# Patient Record
Sex: FEMALE | Race: WHITE | Employment: FULL TIME | URBAN - NONMETROPOLITAN AREA
[De-identification: names, ages, dates, MRNs, and addresses within clinical notes are randomized per-mention and may not be internally consistent; named-entity substitution may affect disease eponyms.]

---

## 2017-02-07 ENCOUNTER — OFFICE VISIT (OUTPATIENT)
Dept: PRIMARY CARE CLINIC | Age: 27
End: 2017-02-07

## 2017-02-07 VITALS
RESPIRATION RATE: 16 BRPM | TEMPERATURE: 98.7 F | HEART RATE: 80 BPM | HEIGHT: 63 IN | OXYGEN SATURATION: 98 % | BODY MASS INDEX: 25.37 KG/M2 | DIASTOLIC BLOOD PRESSURE: 74 MMHG | WEIGHT: 143.2 LBS | SYSTOLIC BLOOD PRESSURE: 100 MMHG

## 2017-02-07 DIAGNOSIS — J01.40 ACUTE NON-RECURRENT PANSINUSITIS: Primary | ICD-10-CM

## 2017-02-07 PROCEDURE — 99214 OFFICE O/P EST MOD 30 MIN: CPT | Performed by: FAMILY MEDICINE

## 2017-02-07 RX ORDER — AMOXICILLIN AND CLAVULANATE POTASSIUM 500; 125 MG/1; MG/1
1 TABLET, FILM COATED ORAL 2 TIMES DAILY
Qty: 20 TABLET | Refills: 0 | Status: SHIPPED | OUTPATIENT
Start: 2017-02-07 | End: 2017-02-17

## 2017-02-07 ASSESSMENT — ENCOUNTER SYMPTOMS
SHORTNESS OF BREATH: 0
VOMITING: 0
NAUSEA: 0
WHEEZING: 0
SORE THROAT: 0
TROUBLE SWALLOWING: 0
SINUS PRESSURE: 1
COUGH: 0
DIARRHEA: 0
CONSTIPATION: 0
EYE DISCHARGE: 0
EYE REDNESS: 0
SINUS COMPLAINT: 1
RHINORRHEA: 1
ABDOMINAL PAIN: 0

## 2017-05-26 ENCOUNTER — HOSPITAL ENCOUNTER (EMERGENCY)
Age: 27
Discharge: HOME | End: 2017-05-26
Attending: EMERGENCY MEDICINE
Payer: MEDICAID

## 2017-05-26 VITALS
DIASTOLIC BLOOD PRESSURE: 67 MMHG | HEART RATE: 76 BPM | OXYGEN SATURATION: 99 % | SYSTOLIC BLOOD PRESSURE: 110 MMHG | TEMPERATURE: 97.5 F | RESPIRATION RATE: 12 BRPM

## 2017-05-26 DIAGNOSIS — G43.009 MIGRAINE WITHOUT AURA AND WITHOUT STATUS MIGRAINOSUS, NOT INTRACTABLE: Primary | ICD-10-CM

## 2017-05-26 PROCEDURE — 96372 THER/PROPH/DIAG INJ SC/IM: CPT

## 2017-05-26 PROCEDURE — 6360000002 HC RX W HCPCS: Performed by: EMERGENCY MEDICINE

## 2017-05-26 PROCEDURE — 99283 EMERGENCY DEPT VISIT LOW MDM: CPT

## 2017-05-26 RX ORDER — PROMETHAZINE HYDROCHLORIDE 25 MG/ML
25 INJECTION, SOLUTION INTRAMUSCULAR; INTRAVENOUS ONCE
Status: COMPLETED | OUTPATIENT
Start: 2017-05-26 | End: 2017-05-26

## 2017-05-26 RX ORDER — TRAMADOL HYDROCHLORIDE 50 MG/1
50 TABLET ORAL EVERY 6 HOURS PRN
Status: ON HOLD | COMMUNITY
End: 2018-08-15 | Stop reason: HOSPADM

## 2017-05-26 RX ORDER — KETOROLAC TROMETHAMINE 30 MG/ML
60 INJECTION, SOLUTION INTRAMUSCULAR; INTRAVENOUS ONCE
Status: COMPLETED | OUTPATIENT
Start: 2017-05-26 | End: 2017-05-26

## 2017-05-26 RX ORDER — NAPROXEN 500 MG/1
500 TABLET ORAL 2 TIMES DAILY WITH MEALS
Status: ON HOLD | COMMUNITY
End: 2018-08-15 | Stop reason: HOSPADM

## 2017-05-26 RX ORDER — DIPHENHYDRAMINE HYDROCHLORIDE 50 MG/ML
50 INJECTION INTRAMUSCULAR; INTRAVENOUS ONCE
Status: COMPLETED | OUTPATIENT
Start: 2017-05-26 | End: 2017-05-26

## 2017-05-26 RX ADMIN — DIPHENHYDRAMINE HYDROCHLORIDE 50 MG: 50 INJECTION, SOLUTION INTRAMUSCULAR; INTRAVENOUS at 18:01

## 2017-05-26 RX ADMIN — KETOROLAC TROMETHAMINE 60 MG: 30 INJECTION, SOLUTION INTRAMUSCULAR at 18:00

## 2017-05-26 RX ADMIN — PROMETHAZINE HYDROCHLORIDE 25 MG: 25 INJECTION INTRAMUSCULAR; INTRAVENOUS at 18:01

## 2017-05-26 ASSESSMENT — PAIN SCALES - GENERAL
PAINLEVEL_OUTOF10: 8
PAINLEVEL_OUTOF10: 8
PAINLEVEL_OUTOF10: 3
PAINLEVEL_OUTOF10: 2

## 2017-05-26 ASSESSMENT — PAIN DESCRIPTION - PAIN TYPE
TYPE: ACUTE PAIN
TYPE: ACUTE PAIN

## 2017-05-26 ASSESSMENT — PAIN DESCRIPTION - DESCRIPTORS: DESCRIPTORS: DULL;NUMBNESS

## 2017-11-29 ENCOUNTER — HOSPITAL ENCOUNTER (EMERGENCY)
Age: 27
Discharge: HOME | End: 2017-11-29
Attending: EMERGENCY MEDICINE
Payer: COMMERCIAL

## 2017-11-29 VITALS
DIASTOLIC BLOOD PRESSURE: 64 MMHG | OXYGEN SATURATION: 100 % | SYSTOLIC BLOOD PRESSURE: 105 MMHG | BODY MASS INDEX: 23.04 KG/M2 | HEART RATE: 84 BPM | RESPIRATION RATE: 16 BRPM | TEMPERATURE: 98.7 F | WEIGHT: 130 LBS | HEIGHT: 63 IN

## 2017-11-29 DIAGNOSIS — S90.512A: Primary | ICD-10-CM

## 2017-11-29 PROCEDURE — 99283 EMERGENCY DEPT VISIT LOW MDM: CPT

## 2017-11-29 PROCEDURE — A6453 SELF-ADHER BAND W <3"/YD: HCPCS

## 2017-11-29 PROCEDURE — 90471 IMMUNIZATION ADMIN: CPT | Performed by: EMERGENCY MEDICINE

## 2017-11-29 PROCEDURE — 90715 TDAP VACCINE 7 YRS/> IM: CPT | Performed by: EMERGENCY MEDICINE

## 2017-11-29 PROCEDURE — 6360000002 HC RX W HCPCS: Performed by: EMERGENCY MEDICINE

## 2017-11-29 RX ADMIN — TETANUS TOXOID, REDUCED DIPHTHERIA TOXOID AND ACELLULAR PERTUSSIS VACCINE, ADSORBED 0.5 ML: 5; 2.5; 8; 8; 2.5 SUSPENSION INTRAMUSCULAR at 17:38

## 2017-11-29 ASSESSMENT — PAIN DESCRIPTION - PAIN TYPE: TYPE: ACUTE PAIN

## 2017-11-29 ASSESSMENT — PAIN DESCRIPTION - DESCRIPTORS: DESCRIPTORS: ACHING

## 2017-11-29 ASSESSMENT — PAIN DESCRIPTION - LOCATION: LOCATION: ANKLE

## 2017-11-29 ASSESSMENT — PAIN DESCRIPTION - ORIENTATION: ORIENTATION: LEFT

## 2017-11-29 ASSESSMENT — PAIN SCALES - GENERAL: PAINLEVEL_OUTOF10: 5

## 2017-11-29 ASSESSMENT — PAIN DESCRIPTION - FREQUENCY: FREQUENCY: INTERMITTENT

## 2017-11-29 NOTE — ED NOTES
Pt stable and ready for dc. Pt is given discharge instructions. Pt verbalizes understanding and ambulates independently.       Tesha Whipple RN  11/29/17 [id number]

## 2017-11-29 NOTE — ED TRIAGE NOTES
Dr Delaney Uriostegui ordered latex free dressing to be applied to abrasion. Pt was showed the Telfa stick free dressing said Latex free. Pt stated she wanted Coban around it. She was showed the Coban package stated it contained \"Natural Rubber Latex\". She stated she has never had an issue and she wanted it used. Dressing applied.

## 2017-11-29 NOTE — ED TRIAGE NOTES
Pt had storm window fall on leg a few weeks ago and cause cut . It healed, but she cut the scab shaving. She put a band aid on it last night, removed it after work today and now has allergic reactions to area because of latex.  Left ankle approx 2cm x 0.5cm abrasion type reaction

## 2018-04-05 ENCOUNTER — EMPLOYEE WELLNESS (OUTPATIENT)
Dept: OTHER | Age: 28
End: 2018-04-05

## 2018-04-05 LAB
CHOLESTEROL, TOTAL: 216 MG/DL (ref 0–199)
FASTING: YES
GLUCOSE BLD-MCNC: 95 MG/DL (ref 74–109)
HDLC SERPL-MCNC: 47 MG/DL (ref 40–90)
LDL CHOLESTEROL CALCULATED: 153 MG/DL
TRIGL SERPL-MCNC: 80 MG/DL (ref 0–199)

## 2018-04-10 VITALS — BODY MASS INDEX: 26.93 KG/M2 | WEIGHT: 152 LBS

## 2018-06-24 ENCOUNTER — HOSPITAL ENCOUNTER (EMERGENCY)
Age: 28
Discharge: HOME | End: 2018-06-25
Attending: EMERGENCY MEDICINE
Payer: COMMERCIAL

## 2018-06-24 VITALS
DIASTOLIC BLOOD PRESSURE: 73 MMHG | RESPIRATION RATE: 18 BRPM | OXYGEN SATURATION: 100 % | SYSTOLIC BLOOD PRESSURE: 108 MMHG | HEART RATE: 97 BPM | TEMPERATURE: 98.4 F

## 2018-06-24 DIAGNOSIS — R10.2 PELVIC PAIN IN FEMALE: ICD-10-CM

## 2018-06-24 DIAGNOSIS — R10.2 PAIN IN FEMALE PELVIS: Primary | ICD-10-CM

## 2018-06-24 LAB
ALBUMIN SERPL-MCNC: 3.6 GM/DL (ref 3.4–5)
ALP BLD-CCNC: 58 U/L (ref 46–116)
ALT SERPL-CCNC: 18 U/L (ref 14–63)
AMORPHOUS: NORMAL
ANION GAP: 11 MEQ/L (ref 8–16)
AST SERPL-CCNC: 17 U/L (ref 15–37)
BACTERIA: NORMAL
BILIRUB SERPL-MCNC: 0.3 MG/DL (ref 0.2–1)
BILIRUBIN URINE: NEGATIVE
BLOOD, URINE: NEGATIVE
BUN BLDV-MCNC: 21 MG/DL (ref 7–18)
CASTS UA: NORMAL /LPF
CHARACTER, URINE: CLEAR
CHLORIDE BLD-SCNC: 102 MEQ/L (ref 98–107)
CO2: 25 MEQ/L (ref 21–32)
COLOR: NORMAL
CREAT SERPL-MCNC: 0.9 MG/DL (ref 0.6–1.3)
CRYSTALS, UA: NORMAL
EPITHELIAL CELLS, UA: NORMAL /HPF
GFR, ESTIMATED: 80 ML/MIN/1.73M2
GLUCOSE BLD-MCNC: 95 MG/DL (ref 74–106)
GLUCOSE, URINE: NEGATIVE MG/DL
KETONES, URINE: NEGATIVE
LEUKOCYTE ESTERASE, URINE: NEGATIVE
MUCUS: NORMAL
NITRITE, URINE: NEGATIVE
PH UA: 7 (ref 5–9)
POC CALCIUM: 8.4 MG/DL (ref 8.5–10.1)
POTASSIUM SERPL-SCNC: 3.5 MEQ/L (ref 3.5–5.1)
PREGNANCY, URINE: NEGATIVE
PROTEIN UA: NEGATIVE MG/DL
RBC UA: NORMAL /HPF
REFLEX TO URINE C & S: NORMAL
SODIUM BLD-SCNC: 138 MEQ/L (ref 136–145)
SPECIFIC GRAVITY UA: 1.02 (ref 1–1.03)
TOTAL PROTEIN: 6.9 GM/DL (ref 6.4–8.2)
UROBILINOGEN, URINE: 1 EU/DL (ref 0–1)
WBC UA: NORMAL /HPF

## 2018-06-24 PROCEDURE — 84703 CHORIONIC GONADOTROPIN ASSAY: CPT

## 2018-06-24 PROCEDURE — 81001 URINALYSIS AUTO W/SCOPE: CPT

## 2018-06-24 PROCEDURE — 99284 EMERGENCY DEPT VISIT MOD MDM: CPT

## 2018-06-24 PROCEDURE — 36415 COLL VENOUS BLD VENIPUNCTURE: CPT

## 2018-06-24 PROCEDURE — 85025 COMPLETE CBC W/AUTO DIFF WBC: CPT

## 2018-06-24 PROCEDURE — 80053 COMPREHEN METABOLIC PANEL: CPT

## 2018-06-24 ASSESSMENT — PAIN DESCRIPTION - ORIENTATION: ORIENTATION: LOWER

## 2018-06-24 ASSESSMENT — PAIN SCALES - GENERAL: PAINLEVEL_OUTOF10: 7

## 2018-06-24 ASSESSMENT — PAIN DESCRIPTION - LOCATION: LOCATION: ABDOMEN

## 2018-06-24 ASSESSMENT — PAIN DESCRIPTION - PAIN TYPE: TYPE: ACUTE PAIN

## 2018-06-25 ENCOUNTER — HOSPITAL ENCOUNTER (OUTPATIENT)
Dept: ULTRASOUND IMAGING | Age: 28
Discharge: HOME | End: 2018-06-25
Payer: COMMERCIAL

## 2018-06-25 VITALS
RESPIRATION RATE: 16 BRPM | HEART RATE: 77 BPM | WEIGHT: 152 LBS | HEIGHT: 63 IN | BODY MASS INDEX: 26.93 KG/M2 | SYSTOLIC BLOOD PRESSURE: 103 MMHG | DIASTOLIC BLOOD PRESSURE: 61 MMHG | TEMPERATURE: 98.6 F | OXYGEN SATURATION: 99 %

## 2018-06-25 DIAGNOSIS — R10.2 PELVIC PAIN IN FEMALE: ICD-10-CM

## 2018-06-25 DIAGNOSIS — G43.909 MIGRAINE WITHOUT STATUS MIGRAINOSUS, NOT INTRACTABLE, UNSPECIFIED MIGRAINE TYPE: Primary | ICD-10-CM

## 2018-06-25 LAB
BASOPHILS # BLD: 0.6 % (ref 0–3)
EOSINOPHILS RELATIVE PERCENT: 2 % (ref 0–4)
HCG QUALITATIVE: NEGATIVE
HCT VFR BLD CALC: 39.3 % (ref 37–47)
HEMOGLOBIN: 13.4 GM/DL (ref 12–16)
LYMPHOCYTES # BLD: 30.4 % (ref 15–47)
MCH RBC QN AUTO: 30.6 PG (ref 27–31)
MCHC RBC AUTO-ENTMCNC: 34 GM/DL (ref 33–37)
MCV RBC AUTO: 90 FL (ref 81–99)
MONOCYTES: 11.1 % (ref 0–12)
PDW BLD-RTO: 12.6 % (ref 11.5–14.5)
PLATELET # BLD: 200 THOU/MM3 (ref 130–400)
PMV BLD AUTO: 7.2 FL (ref 7.4–10.4)
RBC # BLD: 4.37 MILL/MM3 (ref 4.2–5.4)
SEGS: 55.9 % (ref 43–75)
WBC # BLD: 7.1 THOU/MM3 (ref 4.8–10.8)

## 2018-06-25 PROCEDURE — 96375 TX/PRO/DX INJ NEW DRUG ADDON: CPT

## 2018-06-25 PROCEDURE — 96374 THER/PROPH/DIAG INJ IV PUSH: CPT

## 2018-06-25 PROCEDURE — 6360000002 HC RX W HCPCS: Performed by: EMERGENCY MEDICINE

## 2018-06-25 PROCEDURE — 99283 EMERGENCY DEPT VISIT LOW MDM: CPT

## 2018-06-25 PROCEDURE — 76856 US EXAM PELVIC COMPLETE: CPT

## 2018-06-25 PROCEDURE — 84703 CHORIONIC GONADOTROPIN ASSAY: CPT

## 2018-06-25 PROCEDURE — 6370000000 HC RX 637 (ALT 250 FOR IP): Performed by: EMERGENCY MEDICINE

## 2018-06-25 PROCEDURE — 2580000003 HC RX 258: Performed by: EMERGENCY MEDICINE

## 2018-06-25 RX ORDER — DIPHENHYDRAMINE HYDROCHLORIDE 50 MG/ML
12.5 INJECTION INTRAMUSCULAR; INTRAVENOUS ONCE
Status: COMPLETED | OUTPATIENT
Start: 2018-06-25 | End: 2018-06-25

## 2018-06-25 RX ORDER — 0.9 % SODIUM CHLORIDE 0.9 %
1000 INTRAVENOUS SOLUTION INTRAVENOUS ONCE
Status: COMPLETED | OUTPATIENT
Start: 2018-06-25 | End: 2018-06-25

## 2018-06-25 RX ORDER — DEXAMETHASONE SODIUM PHOSPHATE 10 MG/ML
10 INJECTION INTRAMUSCULAR; INTRAVENOUS ONCE
Status: COMPLETED | OUTPATIENT
Start: 2018-06-25 | End: 2018-06-25

## 2018-06-25 RX ORDER — METOCLOPRAMIDE HYDROCHLORIDE 5 MG/ML
10 INJECTION INTRAMUSCULAR; INTRAVENOUS ONCE
Status: COMPLETED | OUTPATIENT
Start: 2018-06-25 | End: 2018-06-25

## 2018-06-25 RX ORDER — KETOROLAC TROMETHAMINE 30 MG/ML
30 INJECTION, SOLUTION INTRAMUSCULAR; INTRAVENOUS ONCE
Status: COMPLETED | OUTPATIENT
Start: 2018-06-25 | End: 2018-06-25

## 2018-06-25 RX ADMIN — SODIUM CHLORIDE 1000 ML: 9 INJECTION, SOLUTION INTRAVENOUS at 20:15

## 2018-06-25 RX ADMIN — DIPHENHYDRAMINE HYDROCHLORIDE 12.5 MG: 50 INJECTION, SOLUTION INTRAMUSCULAR; INTRAVENOUS at 20:14

## 2018-06-25 RX ADMIN — DEXAMETHASONE SODIUM PHOSPHATE 10 MG: 10 INJECTION INTRAMUSCULAR; INTRAVENOUS at 20:15

## 2018-06-25 RX ADMIN — Medication: at 00:05

## 2018-06-25 RX ADMIN — KETOROLAC TROMETHAMINE 30 MG: 30 INJECTION, SOLUTION INTRAMUSCULAR at 20:14

## 2018-06-25 RX ADMIN — METOCLOPRAMIDE 10 MG: 5 INJECTION, SOLUTION INTRAMUSCULAR; INTRAVENOUS at 20:20

## 2018-06-25 ASSESSMENT — ENCOUNTER SYMPTOMS
DIARRHEA: 0
RESPIRATORY NEGATIVE: 1
NAUSEA: 0
CONSTIPATION: 0
EYES NEGATIVE: 1
BACK PAIN: 0
GASTROINTESTINAL NEGATIVE: 1
RESPIRATORY NEGATIVE: 1

## 2018-06-25 ASSESSMENT — PAIN DESCRIPTION - LOCATION: LOCATION: ABDOMEN

## 2018-06-25 ASSESSMENT — PAIN DESCRIPTION - PAIN TYPE: TYPE: ACUTE PAIN

## 2018-06-25 ASSESSMENT — PAIN SCALES - GENERAL
PAINLEVEL_OUTOF10: 10
PAINLEVEL_OUTOF10: 3
PAINLEVEL_OUTOF10: 0

## 2018-08-14 ENCOUNTER — APPOINTMENT (OUTPATIENT)
Dept: CT IMAGING | Age: 28
DRG: 392 | End: 2018-08-14
Payer: COMMERCIAL

## 2018-08-14 ENCOUNTER — HOSPITAL ENCOUNTER (INPATIENT)
Age: 28
LOS: 1 days | Discharge: HOME | DRG: 392 | End: 2018-08-15
Attending: EMERGENCY MEDICINE | Admitting: FAMILY MEDICINE
Payer: COMMERCIAL

## 2018-08-14 DIAGNOSIS — K62.5 HEMORRHAGE OF RECTUM AND ANUS: ICD-10-CM

## 2018-08-14 DIAGNOSIS — R10.13 EPIGASTRIC PAIN: Primary | ICD-10-CM

## 2018-08-14 PROBLEM — K29.80 DUODENITIS: Status: ACTIVE | Noted: 2018-08-14

## 2018-08-14 LAB
-: ABNORMAL
ABSOLUTE EOS #: 0 K/UL (ref 0–0.4)
ABSOLUTE IMMATURE GRANULOCYTE: ABNORMAL K/UL (ref 0–0.3)
ABSOLUTE LYMPH #: 2 K/UL (ref 1–4.8)
ABSOLUTE MONO #: 1.4 K/UL (ref 0.1–1.2)
ALBUMIN SERPL-MCNC: 4.6 G/DL (ref 3.5–5.2)
ALBUMIN/GLOBULIN RATIO: 1.3 (ref 1–2.5)
ALP BLD-CCNC: 65 U/L (ref 35–104)
ALT SERPL-CCNC: 25 U/L (ref 5–33)
AMORPHOUS: ABNORMAL
AMYLASE: 65 U/L (ref 28–100)
ANION GAP SERPL CALCULATED.3IONS-SCNC: 11 MMOL/L (ref 9–17)
AST SERPL-CCNC: 24 U/L
BACTERIA: ABNORMAL
BASOPHILS # BLD: 0 % (ref 0–1)
BASOPHILS ABSOLUTE: 0 K/UL (ref 0–0.2)
BILIRUB SERPL-MCNC: 0.61 MG/DL (ref 0.3–1.2)
BILIRUBIN DIRECT: 0.13 MG/DL
BILIRUBIN URINE: ABNORMAL
BILIRUBIN, INDIRECT: 0.48 MG/DL (ref 0–1)
BUN BLDV-MCNC: 15 MG/DL (ref 6–20)
BUN/CREAT BLD: 22 (ref 9–20)
CALCIUM SERPL-MCNC: 9.6 MG/DL (ref 8.6–10.4)
CASTS UA: ABNORMAL /LPF (ref 0–2)
CHLORIDE BLD-SCNC: 101 MMOL/L (ref 98–107)
CO2: 28 MMOL/L (ref 20–31)
COLOR: ABNORMAL
COMMENT UA: ABNORMAL
CREAT SERPL-MCNC: 0.68 MG/DL (ref 0.5–0.9)
CRYSTALS, UA: ABNORMAL /HPF
DIFFERENTIAL TYPE: ABNORMAL
EOSINOPHILS RELATIVE PERCENT: 0 % (ref 1–7)
EPITHELIAL CELLS UA: ABNORMAL /HPF (ref 0–5)
GFR AFRICAN AMERICAN: >60 ML/MIN
GFR NON-AFRICAN AMERICAN: >60 ML/MIN
GFR SERPL CREATININE-BSD FRML MDRD: ABNORMAL ML/MIN/{1.73_M2}
GFR SERPL CREATININE-BSD FRML MDRD: ABNORMAL ML/MIN/{1.73_M2}
GLOBULIN: 3.6 G/DL (ref 1.5–3.8)
GLUCOSE BLD-MCNC: 113 MG/DL (ref 70–99)
GLUCOSE URINE: NEGATIVE
HCG QUALITATIVE: NEGATIVE
HCT VFR BLD CALC: 41.2 % (ref 36–46)
HCT VFR BLD CALC: 48.8 % (ref 36–46)
HEMOGLOBIN: 13.8 G/DL (ref 12–16)
HEMOGLOBIN: 16.3 G/DL (ref 12–16)
IMMATURE GRANULOCYTES: ABNORMAL %
KETONES, URINE: ABNORMAL
LEUKOCYTE ESTERASE, URINE: NEGATIVE
LIPASE: 30 U/L (ref 13–60)
LYMPHOCYTES # BLD: 14 % (ref 16–46)
MCH RBC QN AUTO: 30.3 PG (ref 26–34)
MCH RBC QN AUTO: 30.7 PG (ref 26–34)
MCHC RBC AUTO-ENTMCNC: 33.5 G/DL (ref 31–37)
MCHC RBC AUTO-ENTMCNC: 33.6 G/DL (ref 31–37)
MCV RBC AUTO: 90.5 FL (ref 80–100)
MCV RBC AUTO: 91.1 FL (ref 80–100)
MONOCYTES # BLD: 9 % (ref 4–11)
MUCUS: ABNORMAL
NITRITE, URINE: NEGATIVE
NRBC AUTOMATED: ABNORMAL PER 100 WBC
NRBC AUTOMATED: NORMAL PER 100 WBC
OTHER OBSERVATIONS UA: ABNORMAL
PDW BLD-RTO: 12.6 % (ref 11–14.5)
PDW BLD-RTO: 12.9 % (ref 11–14.5)
PH UA: 6 (ref 5–6)
PLATELET # BLD: 267 K/UL (ref 140–450)
PLATELET # BLD: 305 K/UL (ref 140–450)
PLATELET ESTIMATE: ABNORMAL
PMV BLD AUTO: 8 FL (ref 6–12)
PMV BLD AUTO: 8 FL (ref 6–12)
POTASSIUM SERPL-SCNC: 3.9 MMOL/L (ref 3.7–5.3)
PROTEIN UA: ABNORMAL
RBC # BLD: 4.52 M/UL (ref 4–5.2)
RBC # BLD: 5.39 M/UL (ref 4–5.2)
RBC # BLD: ABNORMAL 10*6/UL
RBC UA: ABNORMAL /HPF (ref 0–4)
RENAL EPITHELIAL, UA: ABNORMAL /HPF
SEG NEUTROPHILS: 77 % (ref 43–77)
SEGMENTED NEUTROPHILS ABSOLUTE COUNT: 11.3 K/UL (ref 1.8–7.7)
SODIUM BLD-SCNC: 140 MMOL/L (ref 135–144)
SPECIFIC GRAVITY UA: 1.03 (ref 1.01–1.02)
THYROXINE, FREE: 1.09 NG/DL (ref 0.93–1.7)
TOTAL PROTEIN: 8.2 G/DL (ref 6.4–8.3)
TRICHOMONAS: ABNORMAL
TSH SERPL DL<=0.05 MIU/L-ACNC: 3.59 MIU/L (ref 0.3–5)
TURBIDITY: ABNORMAL
URINE HGB: NEGATIVE
UROBILINOGEN, URINE: NORMAL
WBC # BLD: 14.8 K/UL (ref 3.5–11)
WBC # BLD: 9.8 K/UL (ref 3.5–11)
WBC # BLD: ABNORMAL 10*3/UL
WBC UA: ABNORMAL /HPF (ref 0–4)
YEAST: ABNORMAL

## 2018-08-14 PROCEDURE — 74177 CT ABD & PELVIS W/CONTRAST: CPT

## 2018-08-14 PROCEDURE — 96375 TX/PRO/DX INJ NEW DRUG ADDON: CPT

## 2018-08-14 PROCEDURE — 80048 BASIC METABOLIC PNL TOTAL CA: CPT

## 2018-08-14 PROCEDURE — 6360000002 HC RX W HCPCS: Performed by: FAMILY MEDICINE

## 2018-08-14 PROCEDURE — 96376 TX/PRO/DX INJ SAME DRUG ADON: CPT

## 2018-08-14 PROCEDURE — 99223 1ST HOSP IP/OBS HIGH 75: CPT | Performed by: FAMILY MEDICINE

## 2018-08-14 PROCEDURE — 2580000003 HC RX 258: Performed by: FAMILY MEDICINE

## 2018-08-14 PROCEDURE — 2580000003 HC RX 258: Performed by: EMERGENCY MEDICINE

## 2018-08-14 PROCEDURE — 99285 EMERGENCY DEPT VISIT HI MDM: CPT

## 2018-08-14 PROCEDURE — 85025 COMPLETE CBC W/AUTO DIFF WBC: CPT

## 2018-08-14 PROCEDURE — G0378 HOSPITAL OBSERVATION PER HR: HCPCS

## 2018-08-14 PROCEDURE — 99254 IP/OBS CNSLTJ NEW/EST MOD 60: CPT | Performed by: SURGERY

## 2018-08-14 PROCEDURE — 2500000003 HC RX 250 WO HCPCS: Performed by: EMERGENCY MEDICINE

## 2018-08-14 PROCEDURE — 6370000000 HC RX 637 (ALT 250 FOR IP): Performed by: FAMILY MEDICINE

## 2018-08-14 PROCEDURE — 82150 ASSAY OF AMYLASE: CPT

## 2018-08-14 PROCEDURE — 6360000002 HC RX W HCPCS: Performed by: EMERGENCY MEDICINE

## 2018-08-14 PROCEDURE — 36415 COLL VENOUS BLD VENIPUNCTURE: CPT

## 2018-08-14 PROCEDURE — 84703 CHORIONIC GONADOTROPIN ASSAY: CPT

## 2018-08-14 PROCEDURE — 1200000000 HC SEMI PRIVATE

## 2018-08-14 PROCEDURE — 84439 ASSAY OF FREE THYROXINE: CPT

## 2018-08-14 PROCEDURE — 83690 ASSAY OF LIPASE: CPT

## 2018-08-14 PROCEDURE — 6360000004 HC RX CONTRAST MEDICATION: Performed by: EMERGENCY MEDICINE

## 2018-08-14 PROCEDURE — 85027 COMPLETE CBC AUTOMATED: CPT

## 2018-08-14 PROCEDURE — 94761 N-INVAS EAR/PLS OXIMETRY MLT: CPT

## 2018-08-14 PROCEDURE — 2709999900 CT ABDOMEN PELVIS W IV CONTRAST

## 2018-08-14 PROCEDURE — 2500000003 HC RX 250 WO HCPCS: Performed by: FAMILY MEDICINE

## 2018-08-14 PROCEDURE — 96365 THER/PROPH/DIAG IV INF INIT: CPT

## 2018-08-14 PROCEDURE — 81001 URINALYSIS AUTO W/SCOPE: CPT

## 2018-08-14 PROCEDURE — 96374 THER/PROPH/DIAG INJ IV PUSH: CPT

## 2018-08-14 PROCEDURE — 96367 TX/PROPH/DG ADDL SEQ IV INF: CPT

## 2018-08-14 PROCEDURE — 84443 ASSAY THYROID STIM HORMONE: CPT

## 2018-08-14 PROCEDURE — 80076 HEPATIC FUNCTION PANEL: CPT

## 2018-08-14 PROCEDURE — 96366 THER/PROPH/DIAG IV INF ADDON: CPT

## 2018-08-14 PROCEDURE — 87086 URINE CULTURE/COLONY COUNT: CPT

## 2018-08-14 PROCEDURE — C9113 INJ PANTOPRAZOLE SODIUM, VIA: HCPCS | Performed by: FAMILY MEDICINE

## 2018-08-14 RX ORDER — CIPROFLOXACIN 2 MG/ML
400 INJECTION, SOLUTION INTRAVENOUS EVERY 12 HOURS
Status: DISCONTINUED | OUTPATIENT
Start: 2018-08-14 | End: 2018-08-15 | Stop reason: HOSPADM

## 2018-08-14 RX ORDER — SODIUM CHLORIDE 0.9 % (FLUSH) 0.9 %
10 SYRINGE (ML) INJECTION PRN
Status: DISCONTINUED | OUTPATIENT
Start: 2018-08-14 | End: 2018-08-15 | Stop reason: HOSPADM

## 2018-08-14 RX ORDER — OXYCODONE HYDROCHLORIDE AND ACETAMINOPHEN 5; 325 MG/1; MG/1
1 TABLET ORAL EVERY 4 HOURS PRN
Status: DISCONTINUED | OUTPATIENT
Start: 2018-08-14 | End: 2018-08-14

## 2018-08-14 RX ORDER — LEVOTHYROXINE SODIUM 0.03 MG/1
50 TABLET ORAL DAILY
Status: DISCONTINUED | OUTPATIENT
Start: 2018-08-14 | End: 2018-08-15 | Stop reason: HOSPADM

## 2018-08-14 RX ORDER — ONDANSETRON 2 MG/ML
4 INJECTION INTRAMUSCULAR; INTRAVENOUS ONCE
Status: COMPLETED | OUTPATIENT
Start: 2018-08-14 | End: 2018-08-14

## 2018-08-14 RX ORDER — TRAMADOL HYDROCHLORIDE 50 MG/1
50 TABLET ORAL EVERY 6 HOURS PRN
Status: DISCONTINUED | OUTPATIENT
Start: 2018-08-14 | End: 2018-08-15 | Stop reason: HOSPADM

## 2018-08-14 RX ORDER — SODIUM CHLORIDE 9 MG/ML
INJECTION, SOLUTION INTRAVENOUS CONTINUOUS
Status: DISCONTINUED | OUTPATIENT
Start: 2018-08-14 | End: 2018-08-15 | Stop reason: HOSPADM

## 2018-08-14 RX ORDER — 0.9 % SODIUM CHLORIDE 0.9 %
10 VIAL (ML) INJECTION DAILY
Status: DISCONTINUED | OUTPATIENT
Start: 2018-08-14 | End: 2018-08-15 | Stop reason: HOSPADM

## 2018-08-14 RX ORDER — PANTOPRAZOLE SODIUM 40 MG/10ML
40 INJECTION, POWDER, LYOPHILIZED, FOR SOLUTION INTRAVENOUS 2 TIMES DAILY
Status: DISCONTINUED | OUTPATIENT
Start: 2018-08-14 | End: 2018-08-15 | Stop reason: HOSPADM

## 2018-08-14 RX ORDER — MORPHINE SULFATE 2 MG/ML
0.5 INJECTION, SOLUTION INTRAMUSCULAR; INTRAVENOUS
Status: DISCONTINUED | OUTPATIENT
Start: 2018-08-14 | End: 2018-08-15 | Stop reason: HOSPADM

## 2018-08-14 RX ORDER — ONDANSETRON 2 MG/ML
4 INJECTION INTRAMUSCULAR; INTRAVENOUS EVERY 6 HOURS PRN
Status: DISCONTINUED | OUTPATIENT
Start: 2018-08-14 | End: 2018-08-15 | Stop reason: HOSPADM

## 2018-08-14 RX ORDER — SODIUM CHLORIDE 0.9 % (FLUSH) 0.9 %
10 SYRINGE (ML) INJECTION EVERY 12 HOURS SCHEDULED
Status: DISCONTINUED | OUTPATIENT
Start: 2018-08-14 | End: 2018-08-15 | Stop reason: HOSPADM

## 2018-08-14 RX ORDER — 0.9 % SODIUM CHLORIDE 0.9 %
1000 INTRAVENOUS SOLUTION INTRAVENOUS ONCE
Status: COMPLETED | OUTPATIENT
Start: 2018-08-14 | End: 2018-08-14

## 2018-08-14 RX ORDER — CIPROFLOXACIN 2 MG/ML
400 INJECTION, SOLUTION INTRAVENOUS ONCE
Status: COMPLETED | OUTPATIENT
Start: 2018-08-14 | End: 2018-08-14

## 2018-08-14 RX ADMIN — PANTOPRAZOLE SODIUM 40 MG: 40 INJECTION, POWDER, FOR SOLUTION INTRAVENOUS at 20:28

## 2018-08-14 RX ADMIN — PANTOPRAZOLE SODIUM 40 MG: 40 INJECTION, POWDER, FOR SOLUTION INTRAVENOUS at 12:34

## 2018-08-14 RX ADMIN — Medication 10 ML: at 12:34

## 2018-08-14 RX ADMIN — METRONIDAZOLE 500 MG: 500 INJECTION, SOLUTION INTRAVENOUS at 16:15

## 2018-08-14 RX ADMIN — LEVOTHYROXINE SODIUM 50 MCG: 25 TABLET ORAL at 12:35

## 2018-08-14 RX ADMIN — SODIUM CHLORIDE: 9 INJECTION, SOLUTION INTRAVENOUS at 22:34

## 2018-08-14 RX ADMIN — METRONIDAZOLE 500 MG: 500 INJECTION, SOLUTION INTRAVENOUS at 21:40

## 2018-08-14 RX ADMIN — ONDANSETRON 4 MG: 2 INJECTION INTRAMUSCULAR; INTRAVENOUS at 14:28

## 2018-08-14 RX ADMIN — SODIUM CHLORIDE: 9 INJECTION, SOLUTION INTRAVENOUS at 10:58

## 2018-08-14 RX ADMIN — CIPROFLOXACIN 400 MG: 2 INJECTION, SOLUTION INTRAVENOUS at 22:34

## 2018-08-14 RX ADMIN — CIPROFLOXACIN 400 MG: 2 INJECTION, SOLUTION INTRAVENOUS at 12:39

## 2018-08-14 RX ADMIN — ONDANSETRON 4 MG: 2 INJECTION INTRAMUSCULAR; INTRAVENOUS at 08:28

## 2018-08-14 RX ADMIN — METRONIDAZOLE 500 MG: 500 INJECTION, SOLUTION INTRAVENOUS at 10:59

## 2018-08-14 RX ADMIN — SODIUM CHLORIDE 1000 ML: 9 INJECTION, SOLUTION INTRAVENOUS at 08:27

## 2018-08-14 RX ADMIN — IOPAMIDOL 100 ML: 755 INJECTION, SOLUTION INTRAVENOUS at 08:57

## 2018-08-14 ASSESSMENT — PAIN DESCRIPTION - ORIENTATION: ORIENTATION: MID

## 2018-08-14 ASSESSMENT — ENCOUNTER SYMPTOMS
SHORTNESS OF BREATH: 0
DIARRHEA: 1
BLOOD IN STOOL: 0
NAUSEA: 1
EYE PAIN: 0
COUGH: 0
VOMITING: 1
CONSTIPATION: 0
ABDOMINAL PAIN: 1
BACK PAIN: 1

## 2018-08-14 ASSESSMENT — PAIN SCALES - GENERAL
PAINLEVEL_OUTOF10: 0
PAINLEVEL_OUTOF10: 9

## 2018-08-14 ASSESSMENT — PAIN DESCRIPTION - LOCATION: LOCATION: ABDOMEN;BACK

## 2018-08-14 ASSESSMENT — PAIN DESCRIPTION - FREQUENCY: FREQUENCY: INTERMITTENT

## 2018-08-14 ASSESSMENT — PAIN DESCRIPTION - DESCRIPTORS: DESCRIPTORS: SHARP

## 2018-08-14 ASSESSMENT — PAIN DESCRIPTION - PAIN TYPE: TYPE: ACUTE PAIN

## 2018-08-14 NOTE — ED PROVIDER NOTES
[facility]  eMERGENCY dEPARTMENT eNCOUnter      Pt Name: Belia Johnson  MRN: [id number]  [place] 1990  Date of evaluation: 8/14/2018      CHIEF COMPLAINT       Chief Complaint   Patient presents with    Abdominal Pain     upper abdominal into the back- since yesterday - with vomitting and diarrhea - sharp     Back Pain    Emesis    Diarrhea         HISTORY OF PRESENT ILLNESS    Belia Johnson is a 34 y.o. female who presents He complains of abdominal pain, it began yesterday and upper abdomen and radiates to her back associated with nausea vomiting diarrhea has been no fevers or chills she is not pregnant she said her last period was the end of July of vaginal discharge no urinary complaints nothing seems to make it better or worse pain seems to come in waves right now is only about a 2 out of 10      REVIEW OF SYSTEMS         Review of Systems   Constitutional: Negative for chills and fever. HENT: Negative for congestion and ear pain. Eyes: Negative for pain and visual disturbance. Respiratory: Negative for cough and shortness of breath. Cardiovascular: Negative for chest pain, palpitations and leg swelling. Gastrointestinal: Positive for abdominal pain, diarrhea, nausea and vomiting. Negative for blood in stool and constipation. Endocrine: Negative for polydipsia and polyuria. Genitourinary: Negative for difficulty urinating, dysuria, frequency, vaginal bleeding and vaginal discharge. Musculoskeletal: Positive for back pain. Negative for joint swelling, myalgias, neck pain and neck stiffness. Skin: Negative for rash. Neurological: Negative for dizziness, weakness and headaches. Hematological: Negative for adenopathy. Does not bruise/bleed easily. Psychiatric/Behavioral: Negative for confusion, self-injury and suicidal ideas. PAST MEDICAL HISTORY    has a past medical history of Thyroid disease.     SURGICAL HISTORY      has a past surgical history that includes LEEP and Tubal ligation. CURRENT MEDICATIONS       Previous Medications    ACETAMINOPHEN (TYLENOL) 500 MG TABLET    Take 1,000 mg by mouth every 6 hours as needed for Pain. LEVOTHYROXINE (LEVOTHROID) 50 MCG TABLET    Take 1 tablet by mouth daily    NAPROXEN (NAPROSYN) 500 MG TABLET    Take 500 mg by mouth 2 times daily (with meals)    SUMATRIPTAN (IMITREX) 100 MG TABLET    Take 1 tablet by mouth once as needed for Migraine    TRAMADOL (ULTRAM) 50 MG TABLET    Take 50 mg by mouth every 6 hours as needed for Pain       ALLERGIES     is allergic to latex and codeine. FAMILY HISTORY     indicated that her mother is alive. She indicated that her father is alive. She indicated that her brother is alive. She indicated that her maternal grandmother is . She indicated that her maternal grandfather is alive. She indicated that her paternal grandmother is alive. She indicated that her paternal grandfather is alive. She indicated that her daughter is alive. She indicated that her son is alive. family history includes Asthma in her mother; Cancer in her maternal grandmother; Diabetes in her father and paternal grandmother; Emphysema in her maternal grandmother; Heart Disease in her maternal grandfather; Hypertension in her paternal grandmother; Mental Illness in her father. SOCIAL HISTORY      reports that she has never smoked. She has never used smokeless tobacco. She reports that she does not drink alcohol or use drugs. PHYSICAL EXAM     INITIAL VITALS:  weight is 150 lb (68 kg). Her tympanic temperature is 98.5 °F (36.9 °C). Her blood pressure is 113/84 and her pulse is 91. Her respiration is 12 and oxygen saturation is 98%. Physical Exam   Constitutional: She is oriented to person, place, and time. She appears well-developed and well-nourished. HENT:   Head: Normocephalic and atraumatic. Mouth/Throat: Oropharynx is clear and moist.   Eyes: Pupils are equal, round, and reactive to light. Conjunctivae and EOM are normal.   Neck: Normal range of motion. Cardiovascular: Normal rate and regular rhythm. Pulmonary/Chest: Effort normal and breath sounds normal.   Abdominal: Soft. Bowel sounds are normal. There is tenderness. Diffuse abdominal tenderness but the abdomen is soft no masses no rebound or noted   Musculoskeletal: She exhibits no edema or tenderness. Neurological: She is alert and oriented to person, place, and time. Skin: Skin is warm and dry. Psychiatric: She has a normal mood and affect. Her behavior is normal.       DIFFERENTIAL DIAGNOSIS/ MDM:     Abdominal pain nausea vomiting diarrhea    DIAGNOSTIC RESULTS     EKG: All EKG's are interpreted by the Emergency Department Physician who either signs or Co-signs this chart in the absence of a cardiologist.        RADIOLOGY:   I directly visualized the following  images and reviewed the radiologist interpretations:       EXAMINATION:   CT OF THE ABDOMEN AND PELVIS WITH CONTRAST 8/14/2018 9:00 am       TECHNIQUE:   CT of the abdomen and pelvis was performed with the administration of   intravenous contrast. Multiplanar reformatted images are provided for review. Dose modulation, iterative reconstruction, and/or weight based adjustment of   the mA/kV was utilized to reduce the radiation dose to as low as reasonably   achievable.       COMPARISON:   None.       HISTORY:   ORDERING SYSTEM PROVIDED HISTORY: abdominal pain and blood in stool   TECHNOLOGIST PROVIDED HISTORY:   IV Only Contrast   Ordering Physician Provided Reason for Exam: c/o upper mid abd pain for 1 day   Acuity: Acute   Type of Exam: Initial       FINDINGS:   Lung bases:  Visualized lung bases are well aerated without focal airspace   consolidation, lung nodule or lung mass.  No pleural or pericardial effusion. Heart size appears within normal limits.       Organs:  The liver has normal size and contours.  No suspicious intrahepatic   mass lesion identified.  No intra or extrahepatic biliary ductal dilatation. The gallbladder is present.       The spleen, pancreas and adrenal glands have a normal contrast-enhanced CT   appearance.       The kidneys are symmetric in size and contrast-enhanced CT appearance.  No   suspicious renal lesions identified. No obstructive uropathy.       GI/bowel:   No dilated loops of bowel, or findings to suggest obstruction. No mural thickening or adjacent inflammatory changes identified.  The   appendix is normal and nondilated.       Peritoneum/retroperitoneum: There is minimal haziness of the peritoneal fat   in the region of the root of the mesentery and shotty mildly enlarged   mesenteric lymph nodes.  No lymphadenopathy, free fluid or free air   identified in the abdomen or pelvis. Aorta has normal course and caliber. Visualized vascular structures are otherwise grossly unremarkable.       Pelvis: The uterus has an unremarkable contrast enhanced appearance. There   are no adnexal masses. . The urinary bladder is unremarkable.       Bones/soft tissues:  No suspicious osseous lesions are identified.           Impression   Nonspecific haziness of the perineal fat in the region of the root of the   mesentery with shotty mildly enlarged mesenteric lymph nodes is nonspecific   and can be seen in setting of infectious/inflammatory etiologies of the   peritoneum/GI system including peritonitis/sclerosing mesenteritis.    Six-month follow-up CT abdomen/pelvis is suggested to reassess.               ED BEDSIDE ULTRASOUND:       LABS:  Labs Reviewed   BASIC METABOLIC PANEL - Abnormal; Notable for the following:        Result Value    Glucose 113 (*)     Bun/Cre Ratio 22 (*)     All other components within normal limits   CBC WITH AUTO DIFFERENTIAL - Abnormal; Notable for the following:     WBC 14.8 (*)     RBC 5.39 (*)     Hemoglobin 16.3 (*)     Hematocrit 48.8 (*)     Lymphocytes 14 (*)     Eosinophils % 0 (*)     Segs Absolute 11.30 (*) Absolute Mono # 1.40 (*)     All other components within normal limits   URINALYSIS - Abnormal; Notable for the following:     Bilirubin Urine 1+ (*)     Ketones, Urine 2+ (*)     Specific Gravity, UA 1.030 (*)     Protein, UA 1+ (*)     All other components within normal limits   MICROSCOPIC URINALYSIS - Abnormal; Notable for the following:     Bacteria, UA 1+ (*)     Mucus, UA 1+ (*)     Amorphous, UA 1+ (*)     All other components within normal limits   URINE CULTURE   HCG, SERUM, QUALITATIVE   LIPASE   HEPATIC FUNCTION PANEL   AMYLASE       Elevated white blood cell count evidence of dehydration    EMERGENCY DEPARTMENT COURSE:   Vitals:    Vitals:    08/14/18 0804   BP: 113/84   Pulse: 91   Resp: 12   Temp: 98.5 °F (36.9 °C)   TempSrc: Tympanic   SpO2: 98%   Weight: 150 lb (68 kg)     -------------------------  BP: 113/84, Temp: 98.5 °F (36.9 °C), Pulse: 91, Resp: 12        Re-evaluation Notes    Resting comfortablyThe patient did have a bowel movement department and nursing and is inspected there was obvious blood Mixed with the stool    CRITICAL CARE:   IP CONSULT TO HOSPITALIST  IP CONSULT TO GENERAL SURGERY        CONSULTS:  Discussed the case with Dr. Sigrid Dick who reviewed the CAT scan findings at this point we'll admit her IV hydration and antibiotics and that he will see her later today    PROCEDURES:  None    FINAL IMPRESSION      1. Epigastric pain    2. Hemorrhage of rectum and anus          DISPOSITION/PLAN   DISPOSITION Admitted    Condition on Disposition    Stable    PATIENT REFERRED TO:  No follow-up provider specified. DISCHARGE MEDICATIONS:  New Prescriptions    No medications on file       (Please note that portions of this note were completed with a voice recognition program.  Efforts were made to edit the dictations but occasionally words are mis-transcribed.)    Blas MD, F.A.A.E.M.   Attending Emergency Physician                            Bing Herman, MD  08/14/18 Jak Flanagan 380, MD  08/14/18 1038

## 2018-08-14 NOTE — H&P
Hospital Medicine  History and Physical                                                                                                                                                 Full Code    Patient:  Adriane Fairchild  MRN: [id number]                                                                                                                                                                     CHIEF COMPLAINT:  Abdominal pain    History Obtained From:  patient  PCP: No primary care provider on file. HISTORY OF PRESENT ILLNESS:   The patient is a 34 y.o. female who presents with h/o of abdominal pain, pt says pain started few days back, pt says pain started in epigastric region, pt says she felt and no vomitting, denies any bloody stool, pt has not lost any wt. Past Medical History:        Diagnosis Date    Thyroid disease        Past Surgical History:        Procedure Laterality Date    LEEP      TUBAL LIGATION         Medications Prior to Admission:    Prior to Admission medications    Medication Sig Start Date End Date Taking? Authorizing Provider   levothyroxine (LEVOTHROID) 50 MCG tablet Take 1 tablet by mouth daily 10/4/16  Yes RICCO Stevens CNP   SUMAtriptan (IMITREX) 100 MG tablet Take 1 tablet by mouth once as needed for Migraine 5/27/17 5/27/17  Shaunna Pittman MD   traMADol (ULTRAM) 50 MG tablet Take 50 mg by mouth every 6 hours as needed for Pain    Historical Provider, MD   naproxen (NAPROSYN) 500 MG tablet Take 500 mg by mouth 2 times daily (with meals)    Historical Provider, MD   acetaminophen (TYLENOL) 500 MG tablet Take 1,000 mg by mouth every 6 hours as needed for Pain.     Historical Provider, MD       Current meds  Scheduled Meds:   ciprofloxacin  400 mg Intravenous Once    sodium chloride flush  10 mL Intravenous 2 times per day    pantoprazole  40 mg Intravenous BID    And    sodium chloride (PF)  10 mL Intravenous Daily    ciprofloxacin  400 mg Intravenous Q12H  metroNIDAZOLE  500 mg Intravenous Q6H    levothyroxine  50 mcg Oral Daily     Continuous Infusions:   sodium chloride 125 mL/hr at 08/14/18 1058     PRN Meds:.sodium chloride flush, ondansetron, traMADol, morphine    Allergies:  Latex and Codeine    Social History:   TOBACCO:   reports that she has never smoked. She has never used smokeless tobacco.  ETOH:   reports that she does not drink alcohol. OCCUPATION:      Family History:   Family History   Problem Relation Age of Onset    Asthma Mother     Diabetes Father     Mental Illness Father     Cancer Maternal Grandmother         lung and breast cancer    Emphysema Maternal Grandmother     Heart Disease Maternal Grandfather     Diabetes Paternal Grandmother     Hypertension Paternal Grandmother        REVIEW OF SYSTEMS:  Constitutional:  negative for  fevers, chills, sweats and weight loss  HEENT:  negative for  hearing loss, ear drainage, nasal congestion, snoring, hoarseness and voice change  Neck:  No swollen glands and No h/o goiter or thyroid disease  Cardiac:  negative for  chest pain, dyspnea, palpitations, orthopnea, PND, edema  Respiratory:  negative for  dry cough, cough with sputum, dyspnea, wheezing and hemoptysis  Gastrointestinal:  See hpi  :  negative for frequency, dysuria, urinary incontinence, hesitancy, decreased stream and hematuria  Musculoskeletal:  negative for  myalgias, arthralgias, joint swelling and stiff joints  Neuro:  negative for headaches, dizziness, seizures, memory problems, visual disturbance, weakness and syncope      Physical Exam:    Vitals: /71   Pulse 84   Temp 98.4 °F (36.9 °C) (Oral)   Resp 14   Wt 156 lb 1.6 oz (70.8 kg)   LMP 07/30/2018   SpO2 98%   BMI 27.65 kg/m²   General appearance: alert, appears stated age and cooperative  Skin: Skin color, texture, turgor normal. No rashes or lesions  HEENT: Head: Normocephalic, no lesions, without obvious abnormality.   Eye: Normal external eye,

## 2018-08-14 NOTE — CONSULTS
history I suspect her symptoms are related to some sort of gastroenteritis. This is likely a viral illness. PLAN    1. Okay for diet as tolerated. Patient advised to start with clear liquids and bland foods and advance as tolerated. 2. Recommend fluid resuscitation overnight as patient has had significant diarrhea and emesis. 3. Symptom control as needed. 4. No surgical interventions planned. If patient is feeling better she can likely be discharged home tomorrow. 5. Thank you for the consult and allowing me to participate in the care of your patient.         Electronically signed by Reginal Councilman, DO  on 8/14/2018 at 2:59 PM

## 2018-08-15 VITALS
BODY MASS INDEX: 27.65 KG/M2 | HEART RATE: 68 BPM | DIASTOLIC BLOOD PRESSURE: 64 MMHG | OXYGEN SATURATION: 97 % | TEMPERATURE: 97.8 F | RESPIRATION RATE: 16 BRPM | WEIGHT: 156.1 LBS | SYSTOLIC BLOOD PRESSURE: 111 MMHG

## 2018-08-15 LAB
ABSOLUTE EOS #: 0.1 K/UL (ref 0–0.4)
ABSOLUTE IMMATURE GRANULOCYTE: NORMAL K/UL (ref 0–0.3)
ABSOLUTE LYMPH #: 2.8 K/UL (ref 1–4.8)
ABSOLUTE MONO #: 0.7 K/UL (ref 0.1–1.2)
ANION GAP SERPL CALCULATED.3IONS-SCNC: 8 MMOL/L (ref 9–17)
BASOPHILS # BLD: 1 % (ref 0–1)
BASOPHILS ABSOLUTE: 0 K/UL (ref 0–0.2)
BUN BLDV-MCNC: 8 MG/DL (ref 6–20)
BUN/CREAT BLD: 12 (ref 9–20)
CALCIUM SERPL-MCNC: 8.2 MG/DL (ref 8.6–10.4)
CHLORIDE BLD-SCNC: 108 MMOL/L (ref 98–107)
CO2: 25 MMOL/L (ref 20–31)
CREAT SERPL-MCNC: 0.69 MG/DL (ref 0.5–0.9)
CULTURE: NORMAL
DIFFERENTIAL TYPE: NORMAL
EOSINOPHILS RELATIVE PERCENT: 2 % (ref 1–7)
GFR AFRICAN AMERICAN: >60 ML/MIN
GFR NON-AFRICAN AMERICAN: >60 ML/MIN
GFR SERPL CREATININE-BSD FRML MDRD: ABNORMAL ML/MIN/{1.73_M2}
GFR SERPL CREATININE-BSD FRML MDRD: ABNORMAL ML/MIN/{1.73_M2}
GLUCOSE BLD-MCNC: 89 MG/DL (ref 70–99)
HCT VFR BLD CALC: 39.3 % (ref 36–46)
HEMOGLOBIN: 13.4 G/DL (ref 12–16)
IMMATURE GRANULOCYTES: NORMAL %
LYMPHOCYTES # BLD: 36 % (ref 16–46)
Lab: NORMAL
MCH RBC QN AUTO: 31.1 PG (ref 26–34)
MCHC RBC AUTO-ENTMCNC: 34.2 G/DL (ref 31–37)
MCV RBC AUTO: 91.1 FL (ref 80–100)
MONOCYTES # BLD: 10 % (ref 4–11)
NRBC AUTOMATED: NORMAL PER 100 WBC
PDW BLD-RTO: 12.7 % (ref 11–14.5)
PLATELET # BLD: 226 K/UL (ref 140–450)
PLATELET ESTIMATE: NORMAL
PMV BLD AUTO: 8 FL (ref 6–12)
POTASSIUM SERPL-SCNC: 3.9 MMOL/L (ref 3.7–5.3)
RBC # BLD: 4.32 M/UL (ref 4–5.2)
RBC # BLD: NORMAL 10*6/UL
SEG NEUTROPHILS: 51 % (ref 43–77)
SEGMENTED NEUTROPHILS ABSOLUTE COUNT: 3.9 K/UL (ref 1.8–7.7)
SODIUM BLD-SCNC: 141 MMOL/L (ref 135–144)
SPECIMEN DESCRIPTION: NORMAL
STATUS: NORMAL
WBC # BLD: 7.6 K/UL (ref 3.5–11)
WBC # BLD: NORMAL 10*3/UL

## 2018-08-15 PROCEDURE — 6360000002 HC RX W HCPCS: Performed by: FAMILY MEDICINE

## 2018-08-15 PROCEDURE — 80048 BASIC METABOLIC PNL TOTAL CA: CPT

## 2018-08-15 PROCEDURE — 99232 SBSQ HOSP IP/OBS MODERATE 35: CPT | Performed by: SURGERY

## 2018-08-15 PROCEDURE — 2500000003 HC RX 250 WO HCPCS: Performed by: FAMILY MEDICINE

## 2018-08-15 PROCEDURE — 6370000000 HC RX 637 (ALT 250 FOR IP): Performed by: FAMILY MEDICINE

## 2018-08-15 PROCEDURE — C9113 INJ PANTOPRAZOLE SODIUM, VIA: HCPCS | Performed by: FAMILY MEDICINE

## 2018-08-15 PROCEDURE — 36415 COLL VENOUS BLD VENIPUNCTURE: CPT

## 2018-08-15 PROCEDURE — 96366 THER/PROPH/DIAG IV INF ADDON: CPT

## 2018-08-15 PROCEDURE — G0378 HOSPITAL OBSERVATION PER HR: HCPCS

## 2018-08-15 PROCEDURE — 2580000003 HC RX 258: Performed by: FAMILY MEDICINE

## 2018-08-15 PROCEDURE — 99238 HOSP IP/OBS DSCHRG MGMT 30/<: CPT | Performed by: INTERNAL MEDICINE

## 2018-08-15 PROCEDURE — 96376 TX/PRO/DX INJ SAME DRUG ADON: CPT

## 2018-08-15 PROCEDURE — 94761 N-INVAS EAR/PLS OXIMETRY MLT: CPT

## 2018-08-15 PROCEDURE — 85025 COMPLETE CBC W/AUTO DIFF WBC: CPT

## 2018-08-15 RX ORDER — ACETAMINOPHEN 500 MG
1000 TABLET ORAL EVERY 6 HOURS PRN
Qty: 120 TABLET | Refills: 0 | COMMUNITY
Start: 2018-08-15

## 2018-08-15 RX ORDER — CIPROFLOXACIN 500 MG/1
500 TABLET, FILM COATED ORAL 2 TIMES DAILY
Qty: 6 TABLET | Refills: 0 | Status: SHIPPED | OUTPATIENT
Start: 2018-08-15 | End: 2018-08-18

## 2018-08-15 RX ORDER — METRONIDAZOLE 500 MG/1
500 TABLET ORAL 4 TIMES DAILY
Qty: 12 TABLET | Refills: 0 | Status: SHIPPED | OUTPATIENT
Start: 2018-08-15 | End: 2018-08-18

## 2018-08-15 RX ORDER — OMEPRAZOLE 40 MG/1
40 CAPSULE, DELAYED RELEASE ORAL DAILY
Qty: 30 CAPSULE | Refills: 0 | COMMUNITY
Start: 2018-08-15 | End: 2018-08-23

## 2018-08-15 RX ORDER — GREEN TEA/HOODIA GORDONII 315-12.5MG
1 CAPSULE ORAL 3 TIMES DAILY
Qty: 90 TABLET | Refills: 0 | COMMUNITY
Start: 2018-08-15 | End: 2018-08-23

## 2018-08-15 RX ORDER — ONDANSETRON 4 MG/1
4 TABLET, FILM COATED ORAL EVERY 6 HOURS PRN
Qty: 20 TABLET | Refills: 0 | Status: SHIPPED | OUTPATIENT
Start: 2018-08-15 | End: 2018-08-23

## 2018-08-15 RX ORDER — SACCHAROMYCES BOULARDII 250 MG
250 CAPSULE ORAL 2 TIMES DAILY
Qty: 60 CAPSULE | Refills: 0 | COMMUNITY
Start: 2018-08-15 | End: 2018-08-23

## 2018-08-15 RX ADMIN — ONDANSETRON 4 MG: 2 INJECTION INTRAMUSCULAR; INTRAVENOUS at 10:22

## 2018-08-15 RX ADMIN — Medication 10 ML: at 10:22

## 2018-08-15 RX ADMIN — METRONIDAZOLE 500 MG: 500 INJECTION, SOLUTION INTRAVENOUS at 09:50

## 2018-08-15 RX ADMIN — SODIUM CHLORIDE: 9 INJECTION, SOLUTION INTRAVENOUS at 08:54

## 2018-08-15 RX ADMIN — METRONIDAZOLE 500 MG: 500 INJECTION, SOLUTION INTRAVENOUS at 03:56

## 2018-08-15 RX ADMIN — PANTOPRAZOLE SODIUM 40 MG: 40 INJECTION, POWDER, FOR SOLUTION INTRAVENOUS at 08:48

## 2018-08-15 RX ADMIN — Medication 10 ML: at 08:48

## 2018-08-15 RX ADMIN — CIPROFLOXACIN 400 MG: 2 INJECTION, SOLUTION INTRAVENOUS at 11:03

## 2018-08-15 RX ADMIN — Medication 10 ML: at 08:49

## 2018-08-15 RX ADMIN — TRAMADOL HYDROCHLORIDE 50 MG: 50 TABLET, FILM COATED ORAL at 10:20

## 2018-08-15 RX ADMIN — LEVOTHYROXINE SODIUM 50 MCG: 25 TABLET ORAL at 05:28

## 2018-08-15 ASSESSMENT — PAIN SCALES - GENERAL
PAINLEVEL_OUTOF10: 6
PAINLEVEL_OUTOF10: 0
PAINLEVEL_OUTOF10: 0

## 2018-08-15 NOTE — PLAN OF CARE
Problem:  Bowel/Gastric:  Goal: Bowel function will improve  Bowel function will improve  Outcome: Ongoing    Goal: Diagnostic test results will improve  Diagnostic test results will improve  Outcome: Ongoing    Goal: Occurrences of nausea will decrease  Occurrences of nausea will decrease  Outcome: Ongoing    Goal: Occurrences of vomiting will decrease  Occurrences of vomiting will decrease  Outcome: Ongoing      Problem: Fluid Volume:  Goal: Maintenance of adequate hydration will improve  Maintenance of adequate hydration will improve  Outcome: Ongoing

## 2018-08-15 NOTE — PROGRESS NOTES
Thomas Zendejas is a 34 y.o. female patient. Current Facility-Administered Medications   Medication Dose Route Frequency Provider Last Rate Last Dose    sodium chloride flush 0.9 % injection 10 mL  10 mL Intravenous 2 times per day Dmitry Skelton MD        sodium chloride flush 0.9 % injection 10 mL  10 mL Intravenous PRN Dmitry Skelton MD        ondansetron [facility]) injection 4 mg  4 mg Intravenous Q6H PRN Dmitry Skelton MD   4 mg at 08/14/18 1428    pantoprazole (PROTONIX) injection 40 mg  40 mg Intravenous BID Dmitry Skelton MD   40 mg at 08/14/18 2028    And    sodium chloride (PF) 0.9 % injection 10 mL  10 mL Intravenous Daily Dmitry Skelton MD   10 mL at 08/14/18 1234    0.9 % sodium chloride infusion   Intravenous Continuous Dmitry Skelton  mL/hr at 08/14/18 2234      ciprofloxacin (CIPRO) IVPB 400 mg  400 mg Intravenous Q12H Dmitry Skelton MD   Stopped at 08/15/18 0005    metronidazole (FLAGYL) 500 mg in NaCl 100 mL IVPB premix  500 mg Intravenous Q6H Dmitry Skelton MD   Stopped at 08/14/18 2240    levothyroxine (SYNTHROID) tablet 50 mcg  50 mcg Oral Daily Dmitry Skelton MD   50 mcg at 08/14/18 1235    traMADol (ULTRAM) tablet 50 mg  50 mg Oral Q6H PRN Dmitry Skelton MD        morphine injection 0.5 mg  0.5 mg Intravenous Q1H PRN Dmitry Skelton MD         Allergies   Allergen Reactions    Latex Hives    Codeine Nausea And Vomiting     Active Problems:    Duodenitis    Epigastric pain  Resolved Problems:    * No resolved hospital problems. *    Blood pressure (!) 103/56, pulse 69, temperature 98 °F (36.7 °C), resp. rate 16, weight 156 lb 1.6 oz (70.8 kg), last menstrual period 07/30/2018, SpO2 98 %, not currently breastfeeding. Subjective Patient is resting comfortably, tolerating a clear liquid diet. Objective  Patient is well appearing in no acute distress. The patient is breathing easily, heart is RRR.   The patient's lower extremities have a full ROM and no edema. Abdomen is soft and nontender.   Assessment & Plan  Duodenitis - slowly advance diet, IV hydration, morning labs    Eugenio Gama PA-C  8/15/2018

## 2018-08-15 NOTE — DISCHARGE INSTR - DIET

## 2018-08-15 NOTE — PROGRESS NOTES
Surgery Progress Note            PATIENT NAME: Savannah Steiner     TODAY'S DATE: 8/15/2018, 9:41 AM    CHIEF COMPLAINT:  Abdominal pain, nausea/vomiting, diarrhea, abnormal CT finding    SUBJECTIVE:    Pt seen and examined. No acute events overnight. Patient states no further nausea or emesis since admission. Tolerating soft diet without problems. Having bowel movements and states they're still loose but becoming less frequent. Still having some epigastric pain but improving. No new complaints. OBJECTIVE:   VITALS:  BP (!) 102/42   Pulse 61   Temp 98.2 °F (36.8 °C)   Resp 18   Wt 156 lb 1.6 oz (70.8 kg)   LMP 07/30/2018   SpO2 98%   BMI 27.65 kg/m²      INTAKE/OUTPUT:      Intake/Output Summary (Last 24 hours) at 08/15/18 0941  Last data filed at 08/15/18 0550   Gross per 24 hour   Intake             1297 ml   Output                0 ml   Net             1297 ml                 CONSTITUTIONAL:  awake and alert.   No acute distress  CARDIOVASCULAR:  regular rate and rhythm  LUNGS:  clear to auscultation, CTA Bilaterally  ABDOMEN:   Soft, nondistended, mild tenderness to palpation in epigastrium otherwise nontender, bowel sounds present  EXTREMITIES:  No c/c/e      Data:  CBC:   Lab Results   Component Value Date    WBC 7.6 08/15/2018    RBC 4.32 08/15/2018    HGB 13.4 08/15/2018    HCT 39.3 08/15/2018    MCV 91.1 08/15/2018    MCH 31.1 08/15/2018    MCHC 34.2 08/15/2018    RDW 12.7 08/15/2018     08/15/2018    MPV 8.0 08/15/2018     BMP:    Lab Results   Component Value Date     08/15/2018    K 3.9 08/15/2018     08/15/2018    CO2 25 08/15/2018    BUN 8 08/15/2018    LABALBU 4.6 08/14/2018    CREATININE 0.69 08/15/2018    CALCIUM 8.2 08/15/2018    GFRAA >60 08/15/2018    LABGLOM >60 08/15/2018    GLUCOSE 89 08/15/2018       Radiology Review:        ASSESSMENT   56-year-old female with likely viral gastroenteritis    Plan  Okay for diet as tolerated  Continue adequate hydration  Patient can be discharged if tolerating diet and pain controlled  Patient should follow up with her primary care physician in one to 2 weeks to ensure resolution of symptoms. Patient should return to hospital if Symptoms develop or her current symptoms worsen.     Electronically signed by Chris Bueno DO  on 8/15/2018 at 9:41 AM

## 2018-08-15 NOTE — PROGRESS NOTES
regimen  3. Home medications reviewed  4. Follow up Dr. Steph Guzman, [address] [facility]  5.   See orders     Electronically signed by Kenan Johns on 8/15/2018 at 12:49 PM    Hospitalist

## 2018-08-16 ENCOUNTER — CARE COORDINATION (OUTPATIENT)
Dept: CASE MANAGEMENT | Age: 28
End: 2018-08-16

## 2018-08-16 DIAGNOSIS — K29.80 DUODENITIS: Primary | ICD-10-CM

## 2018-08-16 NOTE — CARE COORDINATION
[facility] Transitions Initial Follow Up Call    Call within 2 business days of discharge: Yes    Patient: Sharri Cameron Patient : 1990   MRN: <[id number]>  Reason for Admission: There are no discharge diagnoses documented for the most recent discharge. Discharge Date: 8/15/18 RARS: Readmission Risk Score: 9         Facility: [address] [place] [address] Transitions 24 Hour Call    Care Transitions Interventions       Attempted to contact Pt for initial transitions call. Contact information left to  requesting call back at the earliest convenience.     Carrington Lyles RN BSN   Care Transitions Coordinator  [phone]     Follow Up  Future Appointments  Date Time Provider Marlene Panchal   2018 10:20 AM Gael Dsouza APRN - CNP [facility]       Carrington Lyles RN

## 2018-08-16 NOTE — CARE COORDINATION
[facility] Transitions Follow Up Call    2018    Patient: Nette Branch  Patient : 1990   MRN: <[id number]>  Reason for Admission: There are no discharge diagnoses documented for the most recent discharge. Discharge Date: 8/15/18 RARS: Readmission Risk Score: 9       [place] [facility] [place] Subsequent and Final Call    Subsequent and Final Calls  Do you have any ongoing symptoms?:  No  Have your medications changed?:  Yes  Patient Reports:  see note  Do you have any questions related to your medications?:  No  Do you currently have any active services?:  No  Do you have any needs or concerns that I can assist you with?:  No  Identified Barriers:  None  Care Transitions Interventions  Other Interventions:          CTC received call back from [place] who stated she is doing OK since discharge for epigastric pain on 8/15. Stated she is voiding and stooling without any issues and denies ongoing abdominal pain. Pt is tolerating diet without difficulty. Reviewed medications 1111f. Pt is taking new medications Cipro, Flagyl, Florastor, probiotic, and Prilosec as ordered. Stated she has not needed Zofran since home. No questions or concerns at this time for CTC. Pt has f/u appt with CNP but stated she needs to change appt. Pt declined assistance rescheduling.     Gallito Yu RN BSN   Care Transitions Coordinator  [phone]     Follow Up  Future Appointments  Date Time Provider Marlene Panchal   2018 10:20 AM Eleazar Portillo, APRN - CNP [facility]       Gallito Yu RN

## 2018-08-16 NOTE — DISCHARGE SUMMARY
BAMBI    D: 08/15/2018 17:08:39       T: 08/15/2018 23:21:23     [email or web address]  Job#: [id number]     Doc#: [id number]    CC:

## 2018-08-23 ENCOUNTER — OFFICE VISIT (OUTPATIENT)
Dept: FAMILY MEDICINE CLINIC | Age: 28
End: 2018-08-23
Payer: COMMERCIAL

## 2018-08-23 VITALS
HEIGHT: 63 IN | OXYGEN SATURATION: 99 % | HEART RATE: 84 BPM | SYSTOLIC BLOOD PRESSURE: 118 MMHG | DIASTOLIC BLOOD PRESSURE: 76 MMHG | BODY MASS INDEX: 27.82 KG/M2 | TEMPERATURE: 98.7 F | RESPIRATION RATE: 12 BRPM | WEIGHT: 157 LBS

## 2018-08-23 DIAGNOSIS — K29.80 DUODENITIS: Primary | ICD-10-CM

## 2018-08-23 PROCEDURE — 99213 OFFICE O/P EST LOW 20 MIN: CPT | Performed by: NURSE PRACTITIONER

## 2018-08-23 PROCEDURE — 1111F DSCHRG MED/CURRENT MED MERGE: CPT | Performed by: NURSE PRACTITIONER

## 2018-08-23 ASSESSMENT — ENCOUNTER SYMPTOMS
EYES NEGATIVE: 1
SINUS PRESSURE: 0
DIARRHEA: 1
COUGH: 0
ALLERGIC/IMMUNOLOGIC NEGATIVE: 1
CHEST TIGHTNESS: 0
ABDOMINAL PAIN: 0
CONSTIPATION: 0
RESPIRATORY NEGATIVE: 1
TROUBLE SWALLOWING: 0
NAUSEA: 0
SHORTNESS OF BREATH: 0
VOMITING: 0

## 2018-08-23 ASSESSMENT — PATIENT HEALTH QUESTIONNAIRE - PHQ9
SUM OF ALL RESPONSES TO PHQ QUESTIONS 1-9: 0
1. LITTLE INTEREST OR PLEASURE IN DOING THINGS: 0
2. FEELING DOWN, DEPRESSED OR HOPELESS: 0
SUM OF ALL RESPONSES TO PHQ9 QUESTIONS 1 & 2: 0
SUM OF ALL RESPONSES TO PHQ QUESTIONS 1-9: 0

## 2018-10-12 ENCOUNTER — HOSPITAL ENCOUNTER (OUTPATIENT)
Age: 28
Setting detail: SPECIMEN
Discharge: HOME | End: 2018-10-12
Payer: COMMERCIAL

## 2018-10-12 ENCOUNTER — OFFICE VISIT (OUTPATIENT)
Dept: PRIMARY CARE CLINIC | Age: 28
End: 2018-10-12
Payer: COMMERCIAL

## 2018-10-12 VITALS
BODY MASS INDEX: 27.39 KG/M2 | SYSTOLIC BLOOD PRESSURE: 112 MMHG | RESPIRATION RATE: 16 BRPM | DIASTOLIC BLOOD PRESSURE: 60 MMHG | HEART RATE: 74 BPM | HEIGHT: 63 IN | WEIGHT: 154.6 LBS | OXYGEN SATURATION: 98 %

## 2018-10-12 DIAGNOSIS — R30.0 DYSURIA: ICD-10-CM

## 2018-10-12 DIAGNOSIS — N30.01 ACUTE CYSTITIS WITH HEMATURIA: ICD-10-CM

## 2018-10-12 DIAGNOSIS — G43.709 CHRONIC MIGRAINE WITHOUT AURA WITHOUT STATUS MIGRAINOSUS, NOT INTRACTABLE: ICD-10-CM

## 2018-10-12 DIAGNOSIS — R30.0 DYSURIA: Primary | ICD-10-CM

## 2018-10-12 LAB
-: ABNORMAL
AMORPHOUS: ABNORMAL
BACTERIA: ABNORMAL
BILIRUBIN URINE: NEGATIVE
CASTS UA: ABNORMAL /LPF (ref 0–2)
COLOR: ABNORMAL
COMMENT UA: ABNORMAL
CRYSTALS, UA: ABNORMAL /HPF
EPITHELIAL CELLS UA: ABNORMAL /HPF (ref 0–5)
GLUCOSE URINE: NEGATIVE
KETONES, URINE: NEGATIVE
LEUKOCYTE ESTERASE, URINE: ABNORMAL
MUCUS: ABNORMAL
NITRITE, URINE: NEGATIVE
OTHER OBSERVATIONS UA: ABNORMAL
PH UA: 6 (ref 5–6)
PROTEIN UA: NEGATIVE
RBC UA: ABNORMAL /HPF (ref 0–4)
RENAL EPITHELIAL, UA: ABNORMAL /HPF
SPECIFIC GRAVITY UA: 1.02 (ref 1.01–1.02)
TRICHOMONAS: ABNORMAL
TURBIDITY: ABNORMAL
URINE HGB: ABNORMAL
UROBILINOGEN, URINE: NORMAL
WBC UA: ABNORMAL /HPF (ref 0–4)
YEAST: ABNORMAL

## 2018-10-12 PROCEDURE — 99213 OFFICE O/P EST LOW 20 MIN: CPT | Performed by: PHYSICIAN ASSISTANT

## 2018-10-12 PROCEDURE — 81001 URINALYSIS AUTO W/SCOPE: CPT

## 2018-10-12 RX ORDER — PHENAZOPYRIDINE HYDROCHLORIDE 200 MG/1
200 TABLET, FILM COATED ORAL 3 TIMES DAILY PRN
Qty: 9 TABLET | Refills: 0 | Status: SHIPPED | OUTPATIENT
Start: 2018-10-12 | End: 2018-10-15

## 2018-10-12 RX ORDER — RIZATRIPTAN BENZOATE 10 MG/1
10 TABLET ORAL
Qty: 10 TABLET | Refills: 3 | Status: SHIPPED | OUTPATIENT
Start: 2018-10-12 | End: 2020-01-06

## 2018-10-12 RX ORDER — CIPROFLOXACIN 500 MG/1
500 TABLET, FILM COATED ORAL 2 TIMES DAILY
Qty: 14 TABLET | Refills: 0 | Status: SHIPPED | OUTPATIENT
Start: 2018-10-12 | End: 2020-01-06 | Stop reason: ALTCHOICE

## 2018-10-12 ASSESSMENT — ENCOUNTER SYMPTOMS
NAUSEA: 1
DIARRHEA: 0
VOMITING: 0
BACK PAIN: 1
RESPIRATORY NEGATIVE: 1

## 2018-10-12 NOTE — PROGRESS NOTES
Implant      Comment: nexplanon     Other Topics Concern    Not on file     Social History Narrative    No narrative on file     Family History   Problem Relation Age of Onset    Asthma Mother     Diabetes Father     Mental Illness Father     Cancer Maternal Grandmother         lung and breast cancer    Emphysema Maternal Grandmother     Heart Disease Maternal Grandfather     Diabetes Paternal Grandmother     Hypertension Paternal Grandmother        Allergies   Allergen Reactions    Latex Hives    Codeine Nausea And Vomiting       /60   Pulse 74   Resp 16   Ht 5' 3\" (1.6 m)   Wt 154 lb 9.6 oz (70.1 kg)   SpO2 98%   BMI 27.39 kg/m²     Objective:   Physical Exam   Constitutional: She is oriented to person, place, and time. She appears well-developed and well-nourished. No distress. HENT:   Head: Normocephalic and atraumatic. Nose: Nose normal.   Eyes: Conjunctivae are normal. No scleral icterus. Neck: Normal range of motion. Neck supple. No thyromegaly present. Cardiovascular: Normal rate, regular rhythm and normal heart sounds. Exam reveals no gallop and no friction rub. No murmur heard. Pulmonary/Chest: Effort normal.   Abdominal: Soft. Bowel sounds are normal. She exhibits no distension and no mass. There is no hepatosplenomegaly. There is tenderness. There is no rebound and no guarding. Musculoskeletal: She exhibits no edema. Lymphadenopathy:     She has no cervical adenopathy. Neurological: She is alert and oriented to person, place, and time. Skin: Skin is warm and dry. No rash noted. Psychiatric: She has a normal mood and affect. Nursing note and vitals reviewed.     Hospital Outpatient Visit on 10/12/2018   Component Date Value Ref Range Status    Color, UA 10/12/2018 NOT REPORTED  YEL Final    Turbidity UA 10/12/2018 NOT REPORTED  CLEAR Final    Glucose, Ur 10/12/2018 NEGATIVE  NEG Final    Bilirubin Urine 10/12/2018 NEGATIVE  NEG Final    Ketones, Urine

## 2019-12-15 ENCOUNTER — APPOINTMENT (OUTPATIENT)
Dept: CT IMAGING | Age: 29
End: 2019-12-15

## 2019-12-15 ENCOUNTER — HOSPITAL ENCOUNTER (EMERGENCY)
Age: 29
Discharge: HOME | End: 2019-12-15
Attending: EMERGENCY MEDICINE

## 2019-12-15 VITALS
TEMPERATURE: 98.7 F | RESPIRATION RATE: 14 BRPM | OXYGEN SATURATION: 99 % | SYSTOLIC BLOOD PRESSURE: 112 MMHG | HEART RATE: 68 BPM | DIASTOLIC BLOOD PRESSURE: 54 MMHG

## 2019-12-15 DIAGNOSIS — R10.84 GENERALIZED ABDOMINAL PAIN: Primary | ICD-10-CM

## 2019-12-15 LAB
-: ABNORMAL
ABSOLUTE EOS #: 0.1 K/UL (ref 0–0.4)
ABSOLUTE IMMATURE GRANULOCYTE: NORMAL K/UL (ref 0–0.3)
ABSOLUTE LYMPH #: 2.6 K/UL (ref 1–4.8)
ABSOLUTE MONO #: 0.6 K/UL (ref 0.1–1.2)
ALBUMIN SERPL-MCNC: 4.6 G/DL (ref 3.5–5.2)
ALBUMIN/GLOBULIN RATIO: 1.5 (ref 1–2.5)
ALP BLD-CCNC: 58 U/L (ref 35–104)
ALT SERPL-CCNC: 11 U/L (ref 5–33)
AMORPHOUS: ABNORMAL
ANION GAP SERPL CALCULATED.3IONS-SCNC: 12 MMOL/L (ref 9–17)
AST SERPL-CCNC: 14 U/L
BACTERIA: ABNORMAL
BASOPHILS # BLD: 1 % (ref 0–1)
BASOPHILS ABSOLUTE: 0 K/UL (ref 0–0.2)
BILIRUB SERPL-MCNC: 0.31 MG/DL (ref 0.3–1.2)
BILIRUBIN URINE: NEGATIVE
BUN BLDV-MCNC: 13 MG/DL (ref 6–20)
BUN/CREAT BLD: 18 (ref 9–20)
CALCIUM SERPL-MCNC: 9.7 MG/DL (ref 8.6–10.4)
CASTS UA: ABNORMAL /LPF (ref 0–2)
CASTS UA: ABNORMAL /LPF (ref 0–2)
CHLORIDE BLD-SCNC: 104 MMOL/L (ref 98–107)
CO2: 23 MMOL/L (ref 20–31)
COLOR: ABNORMAL
COMMENT UA: ABNORMAL
CREAT SERPL-MCNC: 0.72 MG/DL (ref 0.5–0.9)
CRYSTALS, UA: ABNORMAL /HPF
DIFFERENTIAL TYPE: NORMAL
EOSINOPHILS RELATIVE PERCENT: 1 % (ref 1–7)
EPITHELIAL CELLS UA: ABNORMAL /HPF (ref 0–5)
GFR AFRICAN AMERICAN: >60 ML/MIN
GFR NON-AFRICAN AMERICAN: >60 ML/MIN
GFR SERPL CREATININE-BSD FRML MDRD: ABNORMAL ML/MIN/{1.73_M2}
GFR SERPL CREATININE-BSD FRML MDRD: ABNORMAL ML/MIN/{1.73_M2}
GLUCOSE BLD-MCNC: 118 MG/DL (ref 70–99)
GLUCOSE URINE: ABNORMAL
HCG QUALITATIVE: NEGATIVE
HCT VFR BLD CALC: 43.6 % (ref 36–46)
HEMOGLOBIN: 15 G/DL (ref 12–16)
IMMATURE GRANULOCYTES: NORMAL %
KETONES, URINE: NEGATIVE
LEUKOCYTE ESTERASE, URINE: NEGATIVE
LIPASE: 25 U/L (ref 13–60)
LYMPHOCYTES # BLD: 29 % (ref 16–46)
MCH RBC QN AUTO: 30.7 PG (ref 26–34)
MCHC RBC AUTO-ENTMCNC: 34.5 G/DL (ref 31–37)
MCV RBC AUTO: 89 FL (ref 80–100)
MONOCYTES # BLD: 7 % (ref 4–11)
MUCUS: ABNORMAL
NITRITE, URINE: NEGATIVE
NRBC AUTOMATED: NORMAL PER 100 WBC
OTHER OBSERVATIONS UA: ABNORMAL
PDW BLD-RTO: 12.9 % (ref 11–14.5)
PH UA: 6 (ref 5–6)
PLATELET # BLD: 270 K/UL (ref 140–450)
PLATELET ESTIMATE: NORMAL
PMV BLD AUTO: 8.1 FL (ref 6–12)
POTASSIUM SERPL-SCNC: 3.7 MMOL/L (ref 3.7–5.3)
PROTEIN UA: NEGATIVE
RBC # BLD: 4.89 M/UL (ref 4–5.2)
RBC # BLD: NORMAL 10*6/UL
RBC UA: ABNORMAL /HPF (ref 0–4)
RENAL EPITHELIAL, UA: ABNORMAL /HPF
SEG NEUTROPHILS: 62 % (ref 43–77)
SEGMENTED NEUTROPHILS ABSOLUTE COUNT: 5.6 K/UL (ref 1.8–7.7)
SODIUM BLD-SCNC: 139 MMOL/L (ref 135–144)
SPECIFIC GRAVITY UA: 1.03 (ref 1.01–1.02)
TOTAL PROTEIN: 7.6 G/DL (ref 6.4–8.3)
TRICHOMONAS: ABNORMAL
TURBIDITY: ABNORMAL
URINE HGB: NEGATIVE
UROBILINOGEN, URINE: NORMAL
WBC # BLD: 9 K/UL (ref 3.5–11)
WBC # BLD: NORMAL 10*3/UL
WBC UA: ABNORMAL /HPF (ref 0–4)
YEAST: ABNORMAL

## 2019-12-15 PROCEDURE — 96374 THER/PROPH/DIAG INJ IV PUSH: CPT

## 2019-12-15 PROCEDURE — 85025 COMPLETE CBC W/AUTO DIFF WBC: CPT

## 2019-12-15 PROCEDURE — 6370000000 HC RX 637 (ALT 250 FOR IP): Performed by: EMERGENCY MEDICINE

## 2019-12-15 PROCEDURE — 2500000003 HC RX 250 WO HCPCS: Performed by: EMERGENCY MEDICINE

## 2019-12-15 PROCEDURE — 81001 URINALYSIS AUTO W/SCOPE: CPT

## 2019-12-15 PROCEDURE — 6360000002 HC RX W HCPCS: Performed by: EMERGENCY MEDICINE

## 2019-12-15 PROCEDURE — 96375 TX/PRO/DX INJ NEW DRUG ADDON: CPT

## 2019-12-15 PROCEDURE — 83690 ASSAY OF LIPASE: CPT

## 2019-12-15 PROCEDURE — 2580000003 HC RX 258: Performed by: EMERGENCY MEDICINE

## 2019-12-15 PROCEDURE — 99284 EMERGENCY DEPT VISIT MOD MDM: CPT

## 2019-12-15 PROCEDURE — 80053 COMPREHEN METABOLIC PANEL: CPT

## 2019-12-15 PROCEDURE — 84703 CHORIONIC GONADOTROPIN ASSAY: CPT

## 2019-12-15 PROCEDURE — 74176 CT ABD & PELVIS W/O CONTRAST: CPT

## 2019-12-15 RX ORDER — DICYCLOMINE HYDROCHLORIDE 10 MG/1
10 CAPSULE ORAL EVERY 6 HOURS PRN
Qty: 20 CAPSULE | Refills: 0 | Status: SHIPPED | OUTPATIENT
Start: 2019-12-15 | End: 2020-01-06 | Stop reason: SDUPTHER

## 2019-12-15 RX ORDER — 0.9 % SODIUM CHLORIDE 0.9 %
1000 INTRAVENOUS SOLUTION INTRAVENOUS ONCE
Status: COMPLETED | OUTPATIENT
Start: 2019-12-15 | End: 2019-12-15

## 2019-12-15 RX ORDER — DICYCLOMINE HYDROCHLORIDE 10 MG/1
10 CAPSULE ORAL ONCE
Status: COMPLETED | OUTPATIENT
Start: 2019-12-15 | End: 2019-12-15

## 2019-12-15 RX ORDER — KETOROLAC TROMETHAMINE 30 MG/ML
15 INJECTION, SOLUTION INTRAMUSCULAR; INTRAVENOUS ONCE
Status: COMPLETED | OUTPATIENT
Start: 2019-12-15 | End: 2019-12-15

## 2019-12-15 RX ADMIN — FAMOTIDINE 20 MG: 10 INJECTION, SOLUTION INTRAVENOUS at 19:22

## 2019-12-15 RX ADMIN — KETOROLAC TROMETHAMINE 15 MG: 30 INJECTION, SOLUTION INTRAMUSCULAR at 19:22

## 2019-12-15 RX ADMIN — SODIUM CHLORIDE 1000 ML: 9 INJECTION, SOLUTION INTRAVENOUS at 19:25

## 2019-12-15 RX ADMIN — DICYCLOMINE HYDROCHLORIDE 10 MG: 10 CAPSULE ORAL at 19:22

## 2019-12-15 ASSESSMENT — PAIN DESCRIPTION - ORIENTATION: ORIENTATION: LOWER

## 2019-12-15 ASSESSMENT — PAIN SCALES - GENERAL
PAINLEVEL_OUTOF10: 6
PAINLEVEL_OUTOF10: 6

## 2019-12-15 ASSESSMENT — ENCOUNTER SYMPTOMS
BACK PAIN: 1
DIARRHEA: 1
ABDOMINAL PAIN: 1
NAUSEA: 1

## 2019-12-15 ASSESSMENT — PAIN DESCRIPTION - DESCRIPTORS: DESCRIPTORS: CRAMPING

## 2019-12-15 ASSESSMENT — PAIN DESCRIPTION - LOCATION: LOCATION: ABDOMEN

## 2019-12-15 ASSESSMENT — PAIN DESCRIPTION - PAIN TYPE: TYPE: ACUTE PAIN

## 2020-01-06 ENCOUNTER — HOSPITAL ENCOUNTER (OUTPATIENT)
Age: 30
Setting detail: SPECIMEN
Discharge: HOME | End: 2020-01-06

## 2020-01-06 ENCOUNTER — OFFICE VISIT (OUTPATIENT)
Dept: PRIMARY CARE CLINIC | Age: 30
End: 2020-01-06

## 2020-01-06 VITALS
DIASTOLIC BLOOD PRESSURE: 68 MMHG | SYSTOLIC BLOOD PRESSURE: 104 MMHG | OXYGEN SATURATION: 98 % | BODY MASS INDEX: 30.3 KG/M2 | WEIGHT: 171 LBS | HEIGHT: 63 IN | RESPIRATION RATE: 16 BRPM | TEMPERATURE: 97.8 F | HEART RATE: 90 BPM

## 2020-01-06 LAB
-: ABNORMAL
AMORPHOUS: ABNORMAL
BACTERIA: ABNORMAL
BILIRUBIN URINE: NEGATIVE
CASTS UA: ABNORMAL /LPF (ref 0–2)
COLOR: ABNORMAL
COMMENT UA: ABNORMAL
CRYSTALS, UA: ABNORMAL /HPF
EPITHELIAL CELLS UA: ABNORMAL /HPF (ref 0–5)
GLUCOSE URINE: NEGATIVE
HCG(URINE) PREGNANCY TEST: NEGATIVE
KETONES, URINE: NEGATIVE
LEUKOCYTE ESTERASE, URINE: NEGATIVE
MUCUS: ABNORMAL
NITRITE, URINE: NEGATIVE
OTHER OBSERVATIONS UA: ABNORMAL
PH UA: 6 (ref 5–6)
PROTEIN UA: NEGATIVE
RBC UA: ABNORMAL /HPF (ref 0–4)
RENAL EPITHELIAL, UA: ABNORMAL /HPF
SPECIFIC GRAVITY UA: 1.03 (ref 1.01–1.02)
TRICHOMONAS: ABNORMAL
TURBIDITY: ABNORMAL
URINE HGB: NEGATIVE
UROBILINOGEN, URINE: NORMAL
WBC UA: ABNORMAL /HPF (ref 0–4)
YEAST: ABNORMAL

## 2020-01-06 PROCEDURE — 81001 URINALYSIS AUTO W/SCOPE: CPT

## 2020-01-06 PROCEDURE — 87086 URINE CULTURE/COLONY COUNT: CPT

## 2020-01-06 PROCEDURE — 81025 URINE PREGNANCY TEST: CPT

## 2020-01-06 PROCEDURE — 99213 OFFICE O/P EST LOW 20 MIN: CPT | Performed by: NURSE PRACTITIONER

## 2020-01-06 RX ORDER — DICYCLOMINE HYDROCHLORIDE 10 MG/1
10 CAPSULE ORAL EVERY 6 HOURS PRN
Qty: 20 CAPSULE | Refills: 3 | Status: SHIPPED | OUTPATIENT
Start: 2020-01-06 | End: 2022-04-03

## 2020-01-06 ASSESSMENT — PATIENT HEALTH QUESTIONNAIRE - PHQ9
SUM OF ALL RESPONSES TO PHQ QUESTIONS 1-9: 0
SUM OF ALL RESPONSES TO PHQ9 QUESTIONS 1 & 2: 0
2. FEELING DOWN, DEPRESSED OR HOPELESS: 0
SUM OF ALL RESPONSES TO PHQ QUESTIONS 1-9: 0
1. LITTLE INTEREST OR PLEASURE IN DOING THINGS: 0
DEPRESSION UNABLE TO ASSESS: PT REFUSES

## 2020-01-06 ASSESSMENT — ENCOUNTER SYMPTOMS
VOMITING: 0
CONSTIPATION: 0
NAUSEA: 1
DIARRHEA: 1
ABDOMINAL PAIN: 1

## 2020-01-06 NOTE — PATIENT INSTRUCTIONS
Patient Education        Diarrhea: Care Instructions  Your Care Instructions    Diarrhea is loose, watery stools (bowel movements). The exact cause is often hard to find. Sometimes diarrhea is your body's way of getting rid of what caused an upset stomach. Viruses, food poisoning, and many medicines can cause diarrhea. Some people get diarrhea in response to emotional stress, anxiety, or certain foods. Almost everyone has diarrhea now and then. It usually isn't serious, and your stools will return to normal soon. The important thing to do is replace the fluids you have lost, so you can prevent dehydration. The doctor has checked you carefully, but problems can develop later. If you notice any problems or new symptoms, get medical treatment right away. Follow-up care is a key part of your treatment and safety. Be sure to make and go to all appointments, and call your doctor if you are having problems. It's also a good idea to know your test results and keep a list of the medicines you take. How can you care for yourself at home? · Watch for signs of dehydration, which means your body has lost too much water. Dehydration is a serious condition and should be treated right away. Signs of dehydration are:  ? Increasing thirst and dry eyes and mouth. ? Feeling faint or lightheaded. ? Darker urine, and a smaller amount of urine than normal.  · To prevent dehydration, drink plenty of fluids, enough so that your urine is light yellow or clear like water. Choose water and other caffeine-free clear liquids until you feel better. If you have kidney, heart, or liver disease and have to limit fluids, talk with your doctor before you increase the amount of fluids you drink. · Begin eating small amounts of mild foods the next day, if you feel like it. ? Try yogurt that has live cultures of Lactobacillus. (Check the label.)  ?  Avoid spicy foods, fruits, alcohol, and caffeine until 48 hours after all symptoms are gone.  ? Avoid chewing gum that contains sorbitol. ? Avoid dairy products (except for yogurt with Lactobacillus) while you have diarrhea and for 3 days after symptoms are gone. · The doctor may recommend that you take over-the-counter medicine, such as loperamide (Imodium), if you still have diarrhea after 6 hours. Read and follow all instructions on the label. Do not use this medicine if you have bloody diarrhea, a high fever, or other signs of serious illness. Call your doctor if you think you are having a problem with your medicine. When should you call for help? Call 911 anytime you think you may need emergency care. For example, call if:    · You passed out (lost consciousness).     · Your stools are maroon or very bloody.    Call your doctor now or seek immediate medical care if:    · You are dizzy or lightheaded, or you feel like you may faint.     · Your stools are black and look like tar, or they have streaks of blood.     · You have new or worse belly pain.     · You have symptoms of dehydration, such as:  ? Dry eyes and a dry mouth. ? Passing only a little dark urine. ? Feeling thirstier than usual.     · You have a new or higher fever.    Watch closely for changes in your health, and be sure to contact your doctor if:    · Your diarrhea is getting worse.     · You see pus in the diarrhea.     · You are not getting better after 2 days (48 hours). Where can you learn more? Go to https://HealthUnity.Akamedia. org and sign in to your PodTech account. Enter D927 in the NUMBER26 box to learn more about \"Diarrhea: Care Instructions. \"     If you do not have an account, please click on the \"Sign Up Now\" link. Current as of: September 23, 2018  Content Version: 12.1  © 9254-2967 Healthwise, Incorporated. Care instructions adapted under license by [facility] ([facility]).  If you have questions about a medical condition or this instruction, always ask your healthcare professional. Melissa Martins anti-inflammatory medicines such as aspirin, ibuprofen (Advil, Motrin), and naproxen (Aleve). These can cause stomach upset. Talk to your doctor if you take daily aspirin for another health problem. When should you call for help? Call 911 anytime you think you may need emergency care. For example, call if:    · You passed out (lost consciousness).     · You pass maroon or very bloody stools.     · You vomit blood or what looks like coffee grounds.     · You have new, severe belly pain.    Call your doctor now or seek immediate medical care if:    · Your pain gets worse, especially if it becomes focused in one area of your belly.     · You have a new or higher fever.     · Your stools are black and look like tar, or they have streaks of blood.     · You have unexpected vaginal bleeding.     · You have symptoms of a urinary tract infection. These may include:  ? Pain when you urinate. ? Urinating more often than usual.  ? Blood in your urine.     · You are dizzy or lightheaded, or you feel like you may faint.    Watch closely for changes in your health, and be sure to contact your doctor if:    · You are not getting better after 1 day (24 hours). Where can you learn more? Go to https://[employer]. org and sign in to your Gene[employer] account. Enter H281 in the PagaTodo Mobile box to learn more about \"Abdominal Pain: Care Instructions. \"     If you do not have an account, please click on the \"Sign Up Now\" link. Current as of: September 23, 2018  Content Version: 12.1  © 5053-1063 Healthwise, Incorporated. Care instructions adapted under license by [facility] [facility] ([facility]). If you have questions about a medical condition or this instruction, always ask your healthcare professional. [place] [address] any warranty or liability for your use of this information.

## 2020-01-06 NOTE — LETTER
[address] [facility]  [place] [address] [place] [address]  Phone: [phone]  Fax: [phone]        RICCO Wooten CNP      January 6, 2020    Patient:   Colin Morse  Date of Birth   1990  Date of visit   1/6/2020        To Whom it May Concern:      Colin Morse was seen in my clinic on 1/6/2020. Please excuse from work 01/06/2020. If you have any questions or concerns, please don't hesitate to call.       Sincerely,      RICCO Wooten CNP/simeon

## 2020-01-06 NOTE — PROGRESS NOTES
She is allergic to latex and codeine. .    She  reports that she has never smoked. She has never used smokeless tobacco.      Objective:    Vitals:    01/06/20 1118   BP: 104/68   Site: Left Upper Arm   Position: Sitting   Cuff Size: Medium Adult   Pulse: 90   Resp: 16   Temp: 97.8 °F (36.6 °C)   TempSrc: Tympanic   SpO2: 98%   Weight: 171 lb (77.6 kg)   Height: 5' 3\" (1.6 m)     Body mass index is 30.29 kg/m². Review of Systems   Constitutional: Negative for fever. Gastrointestinal: Positive for abdominal pain, diarrhea and nausea. Negative for anorexia, constipation and vomiting. Genitourinary: Negative for dysuria, frequency and hematuria. Musculoskeletal: Positive for myalgias. Neurological: Positive for headaches. Physical Exam  Vitals signs and nursing note reviewed. Constitutional:       Appearance: She is well-developed. HENT:      Head: Normocephalic. Jaw: There is normal jaw occlusion. Right Ear: Hearing, tympanic membrane, ear canal and external ear normal.      Left Ear: Hearing, tympanic membrane, ear canal and external ear normal.      Nose: Nose normal.      Mouth/Throat:      Lips: Pink. Mouth: Mucous membranes are moist.      Pharynx: Oropharynx is clear. Uvula midline. Eyes:      Pupils: Pupils are equal, round, and reactive to light. Neck:      Musculoskeletal: Normal range of motion and neck supple. Cardiovascular:      Rate and Rhythm: Normal rate and regular rhythm. Heart sounds: Normal heart sounds. Pulmonary:      Effort: Pulmonary effort is normal.      Breath sounds: Normal breath sounds. Abdominal:      General: Abdomen is flat. Bowel sounds are increased. Palpations: Abdomen is soft. Tenderness: There is tenderness (mild) in the right lower quadrant and left lower quadrant. There is no guarding or rebound. Negative signs include Mcmullen's sign and McBurney's sign. Skin:     General: Skin is warm and dry.    Neurological:

## 2020-01-08 LAB
CULTURE: NORMAL
Lab: NORMAL
SPECIMEN DESCRIPTION: NORMAL

## 2020-03-02 ENCOUNTER — HOSPITAL ENCOUNTER (EMERGENCY)
Age: 30
Discharge: HOME | End: 2020-03-02

## 2020-03-02 VITALS
BODY MASS INDEX: 30.11 KG/M2 | SYSTOLIC BLOOD PRESSURE: 118 MMHG | DIASTOLIC BLOOD PRESSURE: 74 MMHG | OXYGEN SATURATION: 98 % | RESPIRATION RATE: 16 BRPM | HEART RATE: 108 BPM | TEMPERATURE: 98.8 F | WEIGHT: 170 LBS

## 2020-03-02 LAB
GROUP A STREP CULTURE, REFLEX: POSITIVE
REFLEX THROAT C + S: NORMAL

## 2020-03-02 PROCEDURE — 99213 OFFICE O/P EST LOW 20 MIN: CPT | Performed by: NURSE PRACTITIONER

## 2020-03-02 PROCEDURE — 99213 OFFICE O/P EST LOW 20 MIN: CPT

## 2020-03-02 PROCEDURE — 87880 STREP A ASSAY W/OPTIC: CPT

## 2020-03-02 RX ORDER — AMOXICILLIN 500 MG/1
500 TABLET, FILM COATED ORAL 2 TIMES DAILY
Qty: 20 TABLET | Refills: 0 | Status: SHIPPED | OUTPATIENT
Start: 2020-03-02 | End: 2020-03-12

## 2020-03-02 ASSESSMENT — ENCOUNTER SYMPTOMS
COUGH: 0
TROUBLE SWALLOWING: 0
ABDOMINAL PAIN: 0
SHORTNESS OF BREATH: 0
CHEST TIGHTNESS: 0
SORE THROAT: 1
WHEEZING: 0
STRIDOR: 0
VOICE CHANGE: 0

## 2020-03-02 ASSESSMENT — PAIN SCALES - GENERAL: PAINLEVEL_OUTOF10: 7

## 2020-03-02 ASSESSMENT — PAIN DESCRIPTION - LOCATION: LOCATION: THROAT

## 2020-03-02 ASSESSMENT — PAIN DESCRIPTION - PAIN TYPE: TYPE: ACUTE PAIN

## 2020-03-02 NOTE — ED NOTES
Pt. Released in stable condition, ambulated per self to private car. Instructed pt to follow-up with family doctor as needed for recheck or go directly to the emergency department for any concerns/worsening conditions. Pt. Verbalized understanding of instructions. No questions at this time. RX in hand.       Belkys Munoz RN  03/02/20 9401

## 2020-03-02 NOTE — ED PROVIDER NOTES
[place] [address]  Urgent Care Encounter       CHIEF COMPLAINT       Chief Complaint   Patient presents with    Pharyngitis     Sore throat. Started yesterday. Nurses Notes reviewed and I agree except as noted in the HPI. HISTORY OF PRESENT ILLNESS   Marco A Toth is a 34 y.o. female who presents     Patient states that within the last 24 hours she has had ongoing sore throat, without associated cough. She states that she currently does have both of her tonsils. Pharyngitis   Location:  Generalized  Quality:  Aching  Severity:  Mild  Onset quality:  Sudden  Duration:  24 hours  Timing:  Constant  Progression:  Unchanged  Chronicity:  New  Relieved by:  Nothing  Worsened by:  Nothing  Ineffective treatments:  None tried  Associated symptoms: chills and postnasal drip    Associated symptoms: no abdominal pain, no adenopathy, no chest pain, no cough, no drooling, no ear discharge, no ear pain, no fever, no headaches, no rash, no shortness of breath, no stridor, no trouble swallowing and no voice change    Risk factors: no exposure to strep, no exposure to mono, no sick contacts, no recent dental procedure, no recent endoscopy and no recent ENT procedure        REVIEW OF SYSTEMS     Review of Systems   Constitutional: Positive for chills. Negative for fatigue and fever. HENT: Positive for congestion, postnasal drip and sore throat. Negative for drooling, ear discharge, ear pain, trouble swallowing and voice change. Respiratory: Negative for cough, chest tightness, shortness of breath, wheezing and stridor. Cardiovascular: Negative for chest pain. Gastrointestinal: Negative for abdominal pain. Skin: Negative for rash. Neurological: Negative for dizziness, weakness, light-headedness, numbness and headaches. Hematological: Negative for adenopathy.        PAST MEDICAL HISTORY         Diagnosis Date    Duodenitis 2018    Thyroid disease        SURGICALHISTORY     Patient has a past surgical history that includes LEEP and Tubal ligation. CURRENT MEDICATIONS       Discharge Medication List as of 3/2/2020  3:41 PM      CONTINUE these medications which have NOT CHANGED    Details   dicyclomine (BENTYL) 10 MG capsule Take 1 capsule by mouth every 6 hours as needed (cramping), Disp-20 capsule, R-3Normal      acetaminophen (TYLENOL) 500 MG tablet Take 2 tablets by mouth every 6 hours as needed for Pain or Fever, Disp-120 tablet, R-0OTC      levothyroxine (LEVOTHROID) 50 MCG tablet Take 1 tablet by mouth daily, Disp-30 tablet, R-11             ALLERGIES     Patient is is allergic to latex and codeine. Patients   Immunization History   Administered Date(s) Administered    DTaP (Infanrix) 1990, 1990, 1992, 08/10/1994, 08/25/1997    HIB PRP-T (ActHIB, Hiberix) 1990, 1990, 1992, 08/10/1994    Hepatitis B Adult (Engerix-B) 02/10/2014    Influenza Virus Vaccine 10/05/2018    MMR 08/09/1993, 08/25/1997    Polio IPV (IPOL) 1992, 1992, 1992, 08/10/1994    Rho (D) Immune Globulin 11/13/2014    Tdap (Boostrix, Adacel) 11/29/2017       FAMILY HISTORY     Patient's family history includes Asthma in her mother; Cancer in her maternal grandmother; Diabetes in her father and paternal grandmother; Emphysema in her maternal grandmother; Heart Disease in her maternal grandfather; Hypertension in her paternal grandmother; Mental Illness in her father. SOCIAL HISTORY     Patient  reports that she has never smoked. She has never used smokeless tobacco. She reports that she does not drink alcohol or use drugs. PHYSICAL EXAM     ED TRIAGE VITALS  BP: 118/74, Temp: 98.8 °F (37.1 °C), Pulse: 108, Resp: 16, SpO2: 98 %,Estimated body mass index is 30.11 kg/m² as calculated from the following:    Height as of 1/6/20: 5' 3\" (1.6 m). Weight as of this encounter: 170 lb (77.1 kg). ,Patient's last menstrual period was 02/10/2020 (approximate). Physical Exam  Constitutional:       General: She is not in acute distress. Appearance: Normal appearance. She is not ill-appearing, toxic-appearing or diaphoretic. HENT:      Nose: Congestion present. Mouth/Throat:      Mouth: Mucous membranes are moist.      Pharynx: Posterior oropharyngeal erythema present. No oropharyngeal exudate. Pulmonary:      Effort: Pulmonary effort is normal. No respiratory distress. Musculoskeletal: Normal range of motion. Skin:     General: Skin is warm. Findings: No erythema or rash. Neurological:      General: No focal deficit present. Mental Status: She is alert and oriented to person, place, and time. Sensory: No sensory deficit. Psychiatric:         Mood and Affect: Mood normal.         Thought Content: Thought content normal.         Judgment: Judgment normal.         DIAGNOSTIC RESULTS     Labs:  Results for orders placed or performed during the hospital encounter of 03/02/20   Strep A culture, throat   Result Value Ref Range    REFLEX THROAT C + S NOT INDICATED    STREP A ANTIGEN   Result Value Ref Range    GROUP A STREP CULTURE, REFLEX POSITIVE (A)        IMAGING:    No orders to display     URGENT CARE COURSE:     Vitals:    03/02/20 1514   BP: 118/74   Pulse: 108   Resp: 16   Temp: 98.8 °F (37.1 °C)   TempSrc: Temporal   SpO2: 98%   Weight: 170 lb (77.1 kg)       Medications - No data to display         PROCEDURES:  None    FINAL IMPRESSION      1. Streptococcal sore throat    2. Acute upper respiratory infection          DISPOSITION/ PLAN   Patient is discharged home with prescription for amoxicillin, she is informed that her strep swab is positive, therefore will need antibiotic treatment. Discussed with patient that she should throw old toothbrush to avoid re-contamination, and should utilize over-the-counter Tylenol, Motrin, and warm salt gargles. Patient should also throw old toothbrush to avoid re-contamination.

## 2020-12-23 ENCOUNTER — OFFICE VISIT (OUTPATIENT)
Dept: PRIMARY CARE CLINIC | Age: 30
End: 2020-12-23

## 2020-12-23 ENCOUNTER — HOSPITAL ENCOUNTER (OUTPATIENT)
Age: 30
Setting detail: SPECIMEN
Discharge: HOME | End: 2020-12-23

## 2020-12-23 VITALS
TEMPERATURE: 98.2 F | BODY MASS INDEX: 31.33 KG/M2 | WEIGHT: 176.8 LBS | HEIGHT: 63 IN | DIASTOLIC BLOOD PRESSURE: 74 MMHG | SYSTOLIC BLOOD PRESSURE: 114 MMHG | HEART RATE: 82 BPM | RESPIRATION RATE: 18 BRPM | OXYGEN SATURATION: 99 %

## 2020-12-23 LAB — S PYO AG THROAT QL: NORMAL

## 2020-12-23 PROCEDURE — 87880 STREP A ASSAY W/OPTIC: CPT | Performed by: NURSE PRACTITIONER

## 2020-12-23 PROCEDURE — 99213 OFFICE O/P EST LOW 20 MIN: CPT

## 2020-12-23 PROCEDURE — 87651 STREP A DNA AMP PROBE: CPT

## 2020-12-23 PROCEDURE — 99213 OFFICE O/P EST LOW 20 MIN: CPT | Performed by: NURSE PRACTITIONER

## 2020-12-23 ASSESSMENT — ENCOUNTER SYMPTOMS
VOMITING: 0
GASTROINTESTINAL NEGATIVE: 1
SINUS PRESSURE: 0
ABDOMINAL PAIN: 0
SORE THROAT: 1
RHINORRHEA: 0
RESPIRATORY NEGATIVE: 1
COUGH: 0
NAUSEA: 0

## 2020-12-23 NOTE — PROGRESS NOTES
[facility]             [address] [place] [address]                        Telephone [phone]             Fax [phone]     Erin Ceballos  1990  XVT:[id number]   Date of visit:  12/23/2020    Subjective:    Erin Ceballos is a 31 y.o.  female who presents to [facility] today (12/23/2020) for evaluation of:    Chief Complaint   Patient presents with    Otalgia     ST       Pharyngitis  This is a new problem. The current episode started today. The problem occurs constantly. The problem has been unchanged. Associated symptoms include a sore throat. Pertinent negatives include no abdominal pain, chest pain, chills, congestion, coughing, fatigue, fever, myalgias, nausea, rash or vomiting. Associated symptoms comments: Bilateral ears \"feel strange like tingling\". The symptoms are aggravated by swallowing. She has tried nothing for the symptoms. The treatment provided no relief. She has the following problem list:  Patient Active Problem List   Diagnosis    Duodenitis    Epigastric pain        Current medications are:  Current Outpatient Medications   Medication Sig Dispense Refill    dicyclomine (BENTYL) 10 MG capsule Take 1 capsule by mouth every 6 hours as needed (cramping) (Patient not taking: Reported on 12/23/2020) 20 capsule 3    acetaminophen (TYLENOL) 500 MG tablet Take 2 tablets by mouth every 6 hours as needed for Pain or Fever (Patient not taking: Reported on 12/23/2020) 120 tablet 0    levothyroxine (LEVOTHROID) 50 MCG tablet Take 1 tablet by mouth daily (Patient not taking: Reported on 12/23/2020) 30 tablet 11     No current facility-administered medications for this visit. She is allergic to latex and codeine. .    She  reports that she has never smoked.  She has never used smokeless tobacco.      Objective:    Vitals:    12/23/20 1157   BP: 114/74   Site: Right Upper Arm Position: Sitting   Pulse: 82   Resp: 18   Temp: 98.2 °F (36.8 °C)   SpO2: 99%   Weight: 176 lb 12.8 oz (80.2 kg)   Height: 5' 3\" (1.6 m)     Body mass index is 31.32 kg/m². Review of Systems   Constitutional: Negative. Negative for appetite change, chills, fatigue and fever. HENT: Positive for sore throat. Negative for congestion, postnasal drip, rhinorrhea and sinus pressure. Respiratory: Negative. Negative for cough. Cardiovascular: Negative. Negative for chest pain. Gastrointestinal: Negative. Negative for abdominal pain, nausea and vomiting. Musculoskeletal: Negative for myalgias. Skin: Negative for rash. Physical Exam  Vitals signs and nursing note reviewed. Constitutional:       Appearance: She is well-developed. HENT:      Head: Normocephalic. Jaw: There is normal jaw occlusion. Right Ear: Ear canal and external ear normal. A middle ear effusion is present. Left Ear: Ear canal and external ear normal. A middle ear effusion is present. Nose: Nose normal.      Mouth/Throat:      Lips: Pink. Mouth: Mucous membranes are moist.      Pharynx: Uvula midline. Pharyngeal swelling and posterior oropharyngeal erythema present. Tonsils: 1+ on the right. 1+ on the left. Eyes:      Pupils: Pupils are equal, round, and reactive to light. Neck:      Musculoskeletal: Normal range of motion and neck supple. Cardiovascular:      Rate and Rhythm: Normal rate and regular rhythm. Heart sounds: Normal heart sounds. Pulmonary:      Effort: Pulmonary effort is normal.      Breath sounds: Normal breath sounds and air entry. Lymphadenopathy:      Head:      Right side of head: Tonsillar adenopathy present. Left side of head: Tonsillar adenopathy present. Skin:     General: Skin is warm and dry. Neurological:      Mental Status: She is alert and oriented to person, place, and time.    Psychiatric:         Behavior: Behavior normal.         Thought Content: Thought content normal.       Assessment and Plan:    Results for POC orders placed in visit on 12/23/20   POCT rapid strep A   Result Value Ref Range    Strep A Ag None Detected None Detected        Diagnosis Orders   1. Pharyngitis, unspecified etiology  POCT rapid strep A    Strep A DNA probe, amplification     We discussed symptoms of Covid-19 and testing. 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 Her last test was yesterday, 12/22/20 and she is awaiting results. At this time I feel she should continue to await test results from yesterday and not test her here. She is in agreement. I recommended alternating tylenol and ibuprofen for pain/fever, increase fluid intake, and eating popsicles and jello for comfort. Warm salt water gargles. Use Chloraseptic spray as needed for sore throat. Follow up with PCP if symptoms not improved or worsen. The use, risks, benefits, and side effects of prescribed or recommended medications were discussed. All questions were answered and the patient/caregiver voiced understanding. No orders of the defined types were placed in this encounter.         Electronically signed by RICCO Anderson CNP on 12/23/20 at 12:02 PM EST

## 2020-12-23 NOTE — PATIENT INSTRUCTIONS
Patient Education        Sore Throat: Care Instructions  Your Care Instructions     Infection by bacteria or a virus causes most sore throats. Cigarette smoke, dry air, air pollution, allergies, and yelling can also cause a sore throat. Sore throats can be painful and annoying. Fortunately, most sore throats go away on their own. If you have a bacterial infection, your doctor may prescribe antibiotics. Follow-up care is a key part of your treatment and safety. Be sure to make and go to all appointments, and call your doctor if you are having problems. It's also a good idea to know your test results and keep a list of the medicines you take. How can you care for yourself at home? · If your doctor prescribed antibiotics, take them as directed. Do not stop taking them just because you feel better. You need to take the full course of antibiotics. · Gargle with warm salt water once an hour to help reduce swelling and relieve discomfort. Use 1 teaspoon of salt mixed in 1 cup of warm water. · Take an over-the-counter pain medicine, such as acetaminophen (Tylenol), ibuprofen (Advil, Motrin), or naproxen (Aleve). Read and follow all instructions on the label. · Be careful when taking over-the-counter cold or flu medicines and Tylenol at the same time. Many of these medicines have acetaminophen, which is Tylenol. Read the labels to make sure that you are not taking more than the recommended dose. Too much acetaminophen (Tylenol) can be harmful. · Drink plenty of fluids. Fluids may help soothe an irritated throat. Hot fluids, such as tea or soup, may help decrease throat pain. · Use over-the-counter throat lozenges to soothe pain. Regular cough drops or hard candy may also help. These should not be given to young children because of the risk of choking. · Do not smoke or allow others to smoke around you. If you need help quitting, talk to your doctor about stop-smoking programs and medicines.  These can increase your chances of quitting for good. · Use a vaporizer or humidifier to add moisture to your bedroom. Follow the directions for cleaning the machine. When should you call for help? Call your doctor now or seek immediate medical care if:    · You have new or worse trouble swallowing.     · Your sore throat gets much worse on one side. Watch closely for changes in your health, and be sure to contact your doctor if you do not get better as expected. Where can you learn more? Go to https://KoolLearning.HexAirbot. org and sign in to your [employer] account. Enter Q472 in the Picatic box to learn more about \"Sore Throat: Care Instructions. \"     If you do not have an account, please click on the \"Sign Up Now\" link. Current as of: April 15, 2020               Content Version: 12.6  © 5765-7501 [employer]. Care instructions adapted under license by [facility] ([place] campus). If you have questions about a medical condition or this instruction, always ask your healthcare professional. [place] [address] any warranty or liability for your use of this information.

## 2020-12-24 LAB
DIRECT EXAM: NORMAL
Lab: NORMAL
SPECIMEN DESCRIPTION: NORMAL

## 2021-01-28 ENCOUNTER — HOSPITAL ENCOUNTER (EMERGENCY)
Age: 31
Discharge: HOME | End: 2021-01-28

## 2021-01-28 VITALS
WEIGHT: 174 LBS | HEIGHT: 63 IN | TEMPERATURE: 97.5 F | DIASTOLIC BLOOD PRESSURE: 66 MMHG | RESPIRATION RATE: 16 BRPM | HEART RATE: 78 BPM | SYSTOLIC BLOOD PRESSURE: 113 MMHG | OXYGEN SATURATION: 99 % | BODY MASS INDEX: 30.83 KG/M2

## 2021-01-28 DIAGNOSIS — L20.9 ATOPIC DERMATITIS, UNSPECIFIED TYPE: Primary | ICD-10-CM

## 2021-01-28 PROCEDURE — 99213 OFFICE O/P EST LOW 20 MIN: CPT

## 2021-01-28 PROCEDURE — 99214 OFFICE O/P EST MOD 30 MIN: CPT | Performed by: NURSE PRACTITIONER

## 2021-01-28 RX ORDER — PREDNISONE 10 MG/1
TABLET ORAL
Qty: 30 TABLET | Refills: 0 | Status: SHIPPED | OUTPATIENT
Start: 2021-01-28 | End: 2021-08-16

## 2021-01-28 ASSESSMENT — ENCOUNTER SYMPTOMS
NAUSEA: 0
SORE THROAT: 0
EYE DISCHARGE: 0
COUGH: 0
SHORTNESS OF BREATH: 0
EYE REDNESS: 0
DIARRHEA: 0
RHINORRHEA: 0
TROUBLE SWALLOWING: 0
VOMITING: 0

## 2021-01-28 ASSESSMENT — PAIN SCALES - GENERAL: PAINLEVEL_OUTOF10: 1

## 2021-01-28 ASSESSMENT — PAIN DESCRIPTION - LOCATION: LOCATION: ABDOMEN

## 2021-01-28 NOTE — ED NOTES
Pt complains of rash on stomach and arms for over a week. States she has tried hydrocortisone cream, but it didn't helped.       Saige Meyer RN  01/28/21 4801

## 2021-01-28 NOTE — ED PROVIDER NOTES
[id number]       Chief Complaint   Patient presents with    Rash       Nurses Notes reviewed and I agree except as noted in the HPI. HISTORY OF PRESENT ILLNESS   Hayley Parr is a 31 y.o. female who presents with complaints of rash. Onset of symptoms 1 week ago, unchanged. Rash present to bilateral forearms and abdomen. No travel. No new exposures. No exposure to similar rash. Associated pruritus. She did have some improvement with hydrocortisone cream and Benadryl. No additional treatment. No additional complaints. REVIEW OF SYSTEMS     Review of Systems   Constitutional: Negative for chills, diaphoresis, fatigue and fever. HENT: Negative for congestion, ear pain, rhinorrhea, sore throat and trouble swallowing. Eyes: Negative for discharge and redness. Respiratory: Negative for cough and shortness of breath. Cardiovascular: Negative for chest pain. Gastrointestinal: Negative for diarrhea, nausea and vomiting. Genitourinary: Negative for decreased urine volume. Musculoskeletal: Negative for arthralgias, joint swelling, neck pain and neck stiffness. Skin: Positive for rash. Neurological: Negative for headaches. Hematological: Negative for adenopathy. Psychiatric/Behavioral: Negative for sleep disturbance. PAST MEDICAL HISTORY         Diagnosis Date    Duodenitis 2018    Thyroid disease        SURGICAL HISTORY     Patient  has a past surgical history that includes LEEP and Tubal ligation.     CURRENT MEDICATIONS       Discharge Medication List as of 1/28/2021  4:54 PM      CONTINUE these medications which have NOT CHANGED    Details   dicyclomine (BENTYL) 10 MG capsule Take 1 capsule by mouth every 6 hours as needed (cramping), Disp-20 capsule, R-3Normal      levothyroxine (LEVOTHROID) 50 MCG tablet Take 1 tablet by mouth daily, Disp-30 tablet, R-11      acetaminophen (TYLENOL) 500 MG tablet Take 2 and oriented to person, place, and time. Sensory: Sensation is intact. Psychiatric:         Mood and Affect: Mood normal.         Behavior: Behavior normal. Behavior is cooperative. DIAGNOSTIC RESULTS   Labs: No results found for this visit on 01/28/21. IMAGING:  No orders to display     URGENT CARE COURSE:     Vitals:    01/28/21 1646   BP: 113/66   Pulse: 78   Resp: 16   Temp: 97.5 °F (36.4 °C)   SpO2: 99%   Weight: 174 lb (78.9 kg)   Height: 5' 3\" (1.6 m)       Medications - No data to display  PROCEDURES:  None  FINALIMPRESSION      1. Atopic dermatitis, unspecified type        DISPOSITION/PLAN   DISPOSITION Decision To Discharge 01/28/2021 04:52:13 PM  Nontoxic, no distress. Exam consistent with atopic dermatitis. No secondary infection. Medication as prescribed, continue current medicine. If symptoms worsen return or go to ER. PATIENT REFERRED TO:  RICCO Doherty CNP  [facility]  [phone]      Follow up as needed. Medication as prescribed, take with food. Continue current medication. If worse return or go to ER.     DISCHARGE MEDICATIONS:  Discharge Medication List as of 1/28/2021  4:54 PM      START taking these medications    Details   predniSONE (DELTASONE) 10 MG tablet Take 4 tablets by mouth days 1-3, 3 tablets days 4-6, 2 tablets days 7-9, 1 tablet days 10-12., Disp-30 tablet, R-0Normal           Discharge Medication List as of 1/28/2021  4:54 PM          [address] - CNP  01/28/21 4354

## 2021-08-16 ENCOUNTER — APPOINTMENT (OUTPATIENT)
Dept: GENERAL RADIOLOGY | Age: 31
End: 2021-08-16

## 2021-08-16 ENCOUNTER — HOSPITAL ENCOUNTER (EMERGENCY)
Age: 31
Discharge: HOME | End: 2021-08-16
Attending: FAMILY MEDICINE

## 2021-08-16 VITALS
HEIGHT: 63 IN | SYSTOLIC BLOOD PRESSURE: 116 MMHG | TEMPERATURE: 97.2 F | RESPIRATION RATE: 16 BRPM | WEIGHT: 180 LBS | DIASTOLIC BLOOD PRESSURE: 84 MMHG | BODY MASS INDEX: 31.89 KG/M2 | HEART RATE: 78 BPM | OXYGEN SATURATION: 95 %

## 2021-08-16 DIAGNOSIS — M25.561 RIGHT KNEE PAIN, UNSPECIFIED CHRONICITY: Primary | ICD-10-CM

## 2021-08-16 PROCEDURE — 73564 X-RAY EXAM KNEE 4 OR MORE: CPT

## 2021-08-16 PROCEDURE — 99282 EMERGENCY DEPT VISIT SF MDM: CPT

## 2021-08-16 RX ORDER — NAPROXEN 500 MG/1
500 TABLET ORAL 2 TIMES DAILY PRN
Qty: 30 TABLET | Refills: 0 | Status: SHIPPED | OUTPATIENT
Start: 2021-08-16 | End: 2022-04-03

## 2021-08-16 ASSESSMENT — PAIN DESCRIPTION - PAIN TYPE: TYPE: ACUTE PAIN

## 2021-08-16 ASSESSMENT — PAIN SCALES - GENERAL: PAINLEVEL_OUTOF10: 5

## 2021-08-16 ASSESSMENT — PAIN DESCRIPTION - ORIENTATION: ORIENTATION: RIGHT

## 2021-08-16 ASSESSMENT — PAIN DESCRIPTION - LOCATION: LOCATION: KNEE

## 2021-08-16 ASSESSMENT — PAIN DESCRIPTION - DESCRIPTORS: DESCRIPTORS: ACHING

## 2021-08-16 NOTE — ED PROVIDER NOTES
[facility]  eMERGENCY dEPARTMENT eNCOUnter          CHIEF COMPLAINT       Chief Complaint   Patient presents with    Knee Injury       Nurses Notes reviewed and I agree except as noted in the HPI. HISTORY OF PRESENT ILLNESS    Amanda You is a 36 y.o. female who presents with right knee pain. Duration of her pain is been 1 day. Patient denies any recent injuries. She notes pain that seems to cross her right knee. She also notes some popping. She does state some previous extensive injuries however denies any orthopedic surgeries. She denies any audible clicks. She denies any knee giving away. She notes some moderate discomfort worse with walking. She notes over-the-counter analgesics. REVIEW OF SYSTEMS     Review of Systems   Constitutional: Positive for activity change. Negative for chills and fever. Musculoskeletal: Positive for arthralgias (right knee pain ). Negative for joint swelling. Skin: Negative for rash and wound. Psychiatric/Behavioral: Negative for agitation and behavioral problems. All other systems reviewed and are negative. PAST MEDICAL HISTORY    has a past medical history of Duodenitis and Thyroid disease. SURGICAL HISTORY      has a past surgical history that includes LEEP and Tubal ligation. CURRENT MEDICATIONS       Discharge Medication List as of 8/16/2021 12:16 PM      CONTINUE these medications which have NOT CHANGED    Details   dicyclomine (BENTYL) 10 MG capsule Take 1 capsule by mouth every 6 hours as needed (cramping), Disp-20 capsule, R-3Normal      acetaminophen (TYLENOL) 500 MG tablet Take 2 tablets by mouth every 6 hours as needed for Pain or Fever, Disp-120 tablet, R-0OTC             ALLERGIES     is allergic to latex and codeine. FAMILY HISTORY     She indicated that her mother is alive. She indicated that her father is alive. She indicated that her brother is alive.  She indicated that her maternal grandmother is . She indicated that her maternal grandfather is alive. She indicated that her paternal grandmother is alive. She indicated that her paternal grandfather is alive. She indicated that her daughter is alive. She indicated that her son is alive. family history includes Asthma in her mother; Cancer in her maternal grandmother; Diabetes in her father and paternal grandmother; Emphysema in her maternal grandmother; Heart Disease in her maternal grandfather; Hypertension in her paternal grandmother; Mental Illness in her father. SOCIAL HISTORY      reports that she has never smoked. She has never used smokeless tobacco. She reports that she does not drink alcohol and does not use drugs. PHYSICAL EXAM     INITIAL VITALS:  height is 5' 3\" (1.6 m) and weight is 180 lb (81.6 kg). Her tympanic temperature is 97.2 °F (36.2 °C). Her blood pressure is 116/84 and her pulse is 78. Her respiration is 16 and oxygen saturation is 95%. Physical Exam  Vitals and nursing note reviewed. Constitutional:       General: She is in acute distress. Musculoskeletal:         General: Tenderness present. No swelling, deformity or signs of injury. Right knee: No swelling, deformity or effusion. Decreased range of motion. Tenderness present over the medial joint line and lateral joint line. No LCL laxity, MCL laxity, ACL laxity or PCL laxity. Normal alignment, normal meniscus and normal patellar mobility. Instability Tests: Anterior drawer test negative. Posterior drawer test negative. Skin:     General: Skin is dry. Findings: No erythema or rash. Neurological:      Mental Status: She is alert.    Psychiatric:         Mood and Affect: Mood normal.         Behavior: Behavior normal.         DIFFERENTIAL DIAGNOSIS:   Knee strain,internal derangement right knee nos,    DIAGNOSTIC RESULTS       RADIOLOGY: non-plain film images(s) such as CT, Ultrasound and MRI are read by the radiologist.      XR KNEE RIGHT (MIN 4 VIEWS) (Final result)  Result time 08/16/21 12:11:31  Final result by Carmen Barbour MD (08/16/21 12:11:31)                Impression:    Normal knee. **This report has been created using voice recognition software.  It may contain minor errors which are inherent in voice recognition technology. **         Final report electronically signed by Dr. Carmen Barbour on 8/16/2021 12:11 PM            Narrative:    PROCEDURE: XR KNEE RIGHT (MIN 4 VIEWS)     CLINICAL INFORMATION: right knee pain     COMPARISON: 11/6/2005     TECHNIQUE: 4 views of the right knee were obtained. FINDINGS: No fracture or dislocation is seen. There is no foreign body. LABS:   Labs Reviewed - No data to display    EMERGENCY DEPARTMENT COURSE:   Vitals:    Vitals:    08/16/21 1151   BP: 116/84   Pulse: 78   Resp: 16   Temp: 97.2 °F (36.2 °C)   TempSrc: Tympanic   SpO2: 95%   Weight: 180 lb (81.6 kg)   Height: 5' 3\" (1.6 m)     On exam the right knee is of normal contour and shape. She exhibits pain over the kneecap area. I do not appreciate any increased laxity. She does walk with a slight L otalgic gait secondary to the pain. X-rays of the right knee do not show any evidence of acute fracture or dislocation. Based on her history of recurrent knee pain I will set her up with orthopedics for further evaluation. Care instructions were provided. Anti-inflammatories were sent to her pharmacy for pain issues. PROCEDURES:  None    FINAL IMPRESSION      1. Right knee pain, unspecified chronicity          DISPOSITION/PLAN   Home. Care instructions provided. Follow-up with Dr. Behzad Nayg on Friday for further evaluations. Work restrictions have been provided.     PATIENT REFERRED TO:  Juan A Tomas MD  [address] [phone]    In 4 days  follow up at [facility] at 9 am      DISCHARGE MEDICATIONS:  Discharge Medication List as of 8/16/2021 12:16 PM      START taking these medications    Details

## 2021-08-16 NOTE — ED NOTES
Pt complains of r knee pain that started yesterday, pt denies any known injury or trauma. Pt alert, resp even and unlabored, skin pink, warm and dry. No swelling or redness noted, ice applied, pedal pulse strong and regular.      Yesenia Alcantar RN  08/16/21 9309

## 2021-08-16 NOTE — ED NOTES
Pt resting, resp even and unlabored, skin pink, warm and dry. Pt given discharge instructions and verbalizes understanding, pt released.      Hazel Vu RN  08/16/21 0080

## 2021-09-11 NOTE — ED PROVIDER NOTES
[facility]  eMERGENCY dEPARTMENT eNCOUnter             [address] COMPLAINT    Chief Complaint   Patient presents with    Abrasion     reaction from latex  band aid, left ankle       Nurses Notes reviewed and I agree except as noted in the HPI. HPI    Franklin Tan is a 24 y.o. female who presents for evaluation of injury to the left ankle. \"a few weeks ago\" a storm window she had removed from the window fell over and scraped the lateral aspect of her left ankle. She states that is scabbed over and was doing fine until she \"forgot\" it was there, and shaved over it last night, removing the scab and causing it to bleed again. She applied a band aid and left it in place all day while she worked. When she removed the band aid this evening, the adhesive took some of the skin off with it, and the new abrasion was \"hurting worse than the original injury\". She then remembered that she is allergic to Latex, and thinks this happened because the bad aid had Latex in it. She was to \"be sure it is going to heal OK on its own\". Pain is 5/10, burning, worse with touching the area. No current bleeding. REVIEW OF SYSTEMS      Review of Systems   Constitutional: Negative for fever and malaise/fatigue. Musculoskeletal: Negative for falls. Skin: Negative for itching and rash. Endo/Heme/Allergies: Does not bruise/bleed easily. All other systems reviewed and are negative. PAST MEDICAL HISTORY     has a past medical history of Thyroid disease. SURGICAL HISTORY     has a past surgical history that includes LEEP.     CURRENT MEDICATIONS    Discharge Medication List as of 11/29/2017  5:30 PM      CONTINUE these medications which have NOT CHANGED    Details   SUMAtriptan (IMITREX) 100 MG tablet Take 1 tablet by mouth once as needed for Migraine, Disp-20 tablet, R-0Print      traMADol (ULTRAM) 50 MG tablet Take 50 mg by mouth every 6 hours as needed for PainHistorical Med      naproxen (NAPROSYN) 500 MG tablet Take 500 mg by mouth 2 times daily (with meals)Historical Med      levothyroxine (LEVOTHROID) 50 MCG tablet Take 1 tablet by mouth daily, Disp-30 tablet, R-11      acetaminophen (TYLENOL) 500 MG tablet Take 1,000 mg by mouth every 6 hours as needed for Pain. ALLERGIES    is allergic to latex and codeine. FAMILY HISTORY    indicated that her mother is alive. She indicated that her father is alive. She indicated that her brother is alive. She indicated that her maternal grandmother is . She indicated that her maternal grandfather is alive. She indicated that her paternal grandmother is alive. She indicated that her paternal grandfather is alive. She indicated that her daughter is alive. She indicated that her son is alive. family history includes Asthma in her mother; Cancer in her maternal grandmother; Diabetes in her father and paternal grandmother; Emphysema in her maternal grandmother; Heart Disease in her maternal grandfather; Hypertension in her paternal grandmother; Mental Illness in her father. SOCIAL HISTORY     reports that she has never smoked. She has never used smokeless tobacco. She reports that she does not drink alcohol or use drugs. PHYSICAL EXAM       INITIAL VITALS: /64   Pulse 84   Temp 98.7 °F (37.1 °C) (Oral)   Resp 16   Ht 5' 3\" (1.6 m)   Wt 130 lb (59 kg)   SpO2 100%   BMI 23.03 kg/m²      Physical Exam   Constitutional: She is oriented to person, place, and time. She appears well-developed and well-nourished. No distress. Cardiovascular: Intact distal pulses. Musculoskeletal: Normal range of motion. She exhibits no edema. Neurological: She is alert and oriented to person, place, and time. Skin: Skin is warm and dry.    #2 3 cm long abrasions left lateral ankle, one almost completely healed, with the second appearing to be fairly fresh, with an open area 2X1 cm, irregular, superficial, repeated cueing /nandini for hand placement,push-off ,and bringing & maintaining hands at handgrips of RW/set-up required/verbal cues/nonverbal cues (demo/gestures)/1 person assist

## 2022-03-29 ENCOUNTER — HOSPITAL ENCOUNTER (EMERGENCY)
Age: 32
Discharge: HOME | End: 2022-03-29

## 2022-03-29 VITALS
BODY MASS INDEX: 28.35 KG/M2 | SYSTOLIC BLOOD PRESSURE: 124 MMHG | HEART RATE: 89 BPM | TEMPERATURE: 97.7 F | DIASTOLIC BLOOD PRESSURE: 74 MMHG | WEIGHT: 160 LBS | OXYGEN SATURATION: 99 % | HEIGHT: 63 IN | RESPIRATION RATE: 14 BRPM

## 2022-03-29 DIAGNOSIS — J02.9 ACUTE PHARYNGITIS, UNSPECIFIED ETIOLOGY: Primary | ICD-10-CM

## 2022-03-29 LAB
FLU A ANTIGEN: NEGATIVE
FLU B ANTIGEN: NEGATIVE

## 2022-03-29 PROCEDURE — 99213 OFFICE O/P EST LOW 20 MIN: CPT | Performed by: NURSE PRACTITIONER

## 2022-03-29 PROCEDURE — 87804 INFLUENZA ASSAY W/OPTIC: CPT

## 2022-03-29 PROCEDURE — 99213 OFFICE O/P EST LOW 20 MIN: CPT

## 2022-03-29 ASSESSMENT — PAIN DESCRIPTION - PAIN TYPE: TYPE: ACUTE PAIN

## 2022-03-29 ASSESSMENT — PAIN DESCRIPTION - DESCRIPTORS: DESCRIPTORS: ACHING;BURNING

## 2022-03-29 ASSESSMENT — ENCOUNTER SYMPTOMS
VOMITING: 0
SORE THROAT: 1
SHORTNESS OF BREATH: 0
SINUS PRESSURE: 0
NAUSEA: 1
DIARRHEA: 0
COUGH: 1

## 2022-03-29 ASSESSMENT — PAIN DESCRIPTION - LOCATION: LOCATION: THROAT;OTHER (COMMENT)

## 2022-03-29 ASSESSMENT — PAIN SCALES - GENERAL: PAINLEVEL_OUTOF10: 7

## 2022-03-29 ASSESSMENT — PAIN DESCRIPTION - PROGRESSION: CLINICAL_PROGRESSION: GRADUALLY WORSENING

## 2022-03-29 ASSESSMENT — PAIN DESCRIPTION - FREQUENCY: FREQUENCY: CONTINUOUS

## 2022-03-29 ASSESSMENT — PAIN - FUNCTIONAL ASSESSMENT: PAIN_FUNCTIONAL_ASSESSMENT: PREVENTS OR INTERFERES SOME ACTIVE ACTIVITIES AND ADLS

## 2022-03-29 NOTE — Clinical Note
Bennett Maurer was seen and treated in our emergency department on 3/29/2022. She may return to work on 03/30/2022. If you have any questions or concerns, please don't hesitate to call.       [facility], APRN - CNP

## 2022-03-29 NOTE — ED PROVIDER NOTES
[address]  Urgent Care Encounter       CHIEF COMPLAINT       Chief Complaint   Patient presents with    Pharyngitis    Muscle Pain    Chills       Nurses Notes reviewed and I agree except as noted in the HPI. HISTORY OF PRESENT ILLNESS   Mar Woodruff is a [address] [place] y.o. female who presents with complaints of a sore throat, body aches and occasional chills. Symptoms started yesterday morning. Sore throat is described as burning. She has some nausea but no vomiting or diarrhea. No fevers. Appetite is decreased. Reports a rare cough and mild nasal congestion. Has taken naproxen for her symptoms. No known sick contacts. The history is provided by the patient. REVIEW OF SYSTEMS     Review of Systems   Constitutional: Positive for appetite change. Negative for chills, fatigue and fever. HENT: Positive for congestion and sore throat. Negative for ear pain and sinus pressure. Respiratory: Positive for cough (Rare). Negative for shortness of breath. Cardiovascular: Negative for chest pain. Gastrointestinal: Positive for nausea. Negative for diarrhea and vomiting. Musculoskeletal: Positive for myalgias. Skin: Negative for rash. Neurological: Negative for headaches (None currently; history of chronic migraine). PAST MEDICAL HISTORY         Diagnosis Date    Duodenitis 2018    Thyroid disease        SURGICALHISTORY     Patient  has a past surgical history that includes LEEP and Tubal ligation.     CURRENT MEDICATIONS       Discharge Medication List as of 3/29/2022  9:27 AM      CONTINUE these medications which have NOT CHANGED    Details   naproxen (NAPROSYN) 500 MG tablet Take 1 tablet by mouth 2 times daily as needed for Pain, Disp-30 tablet, R-0Normal      dicyclomine (BENTYL) 10 MG capsule Take 1 capsule by mouth every 6 hours as needed (cramping), Disp-20 capsule, R-3Normal      acetaminophen (TYLENOL) 500 MG tablet Take 2 tablets by mouth every 6 hours as needed for Pain or Fever, Disp-120 tablet, R-0OTC             ALLERGIES     Patient is is allergic to latex and codeine. Patients   Immunization History   Administered Date(s) Administered    DTaP (Infanrix) 1992, 1992, 1992, 08/10/1994, 08/25/1997    HIB PRP-T (ActHIB, Hiberix) 1992, 1992, 1992, 08/10/1994    Hepatitis B Adult (Engerix-B) 02/10/2014    Influenza Virus Vaccine 10/05/2018    MMR 08/09/1993, 08/25/1997    Polio IPV (IPOL) 1992, 1992, 1992, 08/10/1994    Rho (D) Immune Globulin 11/13/2014    Tdap (Boostrix, Adacel) 11/29/2017       FAMILY HISTORY     Patient's family history includes Asthma in her mother; Cancer in her maternal grandmother; Diabetes in her father and paternal grandmother; Emphysema in her maternal grandmother; Heart Disease in her maternal grandfather; Hypertension in her paternal grandmother; Mental Illness in her father. SOCIAL HISTORY     Patient  reports that she has never smoked. She has never used smokeless tobacco. She reports that she does not drink alcohol and does not use drugs. PHYSICAL EXAM     ED TRIAGE VITALS  BP: 124/74, Temp: 97.7 °F (36.5 °C), Pulse: 89, Resp: 14, SpO2: 99 %,Estimated body mass index is 28.12 kg/m² as calculated from the following:    Height as of this encounter: 5' 3.25\" (1.607 m). Weight as of this encounter: 160 lb (72.6 kg). ,Patient's last menstrual period was 03/17/2022 (approximate). Physical Exam  Vitals and nursing note reviewed. Constitutional:       General: She is not in acute distress. Appearance: She is well-developed. She is not ill-appearing. HENT:      Head: Normocephalic and atraumatic. Right Ear: Tympanic membrane and ear canal normal.      Left Ear: Tympanic membrane and ear canal normal.      Nose: Congestion (Mild) present. Mouth/Throat:      Lips: Pink. Mouth: Mucous membranes are moist.      Pharynx: Oropharynx is clear. Uvula midline. Posterior oropharyngeal erythema present. No pharyngeal swelling, oropharyngeal exudate or uvula swelling. Eyes:      General: Lids are normal. No scleral icterus. Conjunctiva/sclera:      Right eye: Right conjunctiva is not injected. Left eye: Left conjunctiva is not injected. Pupils: Pupils are equal.   Cardiovascular:      Rate and Rhythm: Normal rate and regular rhythm. Heart sounds: Normal heart sounds, S1 normal and S2 normal.   Pulmonary:      Effort: Pulmonary effort is normal. No respiratory distress. Breath sounds: Normal breath sounds. Musculoskeletal:      Comments: Normal active ROM x 4 extremities  Gait steady   Lymphadenopathy:      Comments: No head or neck adenopathy   Skin:     General: Skin is warm and dry. Findings: No rash (to exposed areas of skin). Neurological:      General: No focal deficit present. Mental Status: She is alert and oriented to person, place, and time. Sensory: No sensory deficit. Comments: Answers questions readily and appropriately   Psychiatric:         Mood and Affect: Mood normal.         Speech: Speech normal.         Behavior: Behavior normal. Behavior is cooperative. DIAGNOSTIC RESULTS     Labs:  Results for orders placed or performed during the hospital encounter of 03/29/22   Rapid influenza A/B antigens   Result Value Ref Range    Flu A Antigen Negative NEGATIVE    Flu B Antigen Negative NEGATIVE       IMAGING:    No orders to display         EKG:      URGENT CARE COURSE:     Vitals:    03/29/22 0840   BP: 124/74   Pulse: 89   Resp: 14   Temp: 97.7 °F (36.5 °C)   TempSrc: Temporal   SpO2: 99%   Weight: 160 lb (72.6 kg)   Height: 5' 3.25\" (1.607 m)       Medications - No data to display         PROCEDURES:  None    FINAL IMPRESSION      1. Acute pharyngitis, unspecified etiology          DISPOSITION/ PLAN     Patient presents with acute pharyngitis, most likely viral in etiology.   Influenza screen was negative. Throat does not appear to be strep. Patient to use over-the-counter medications as desired for symptom management. Encourage fluids. Follow-up with family doctor in the next week if symptoms do not improve with treatment. Further instructions were outlined verbally and in the patient's discharge instructions. All the patient's questions were answered. The patient/parent agreed with the plan and was discharged from the [facility] in good condition.       PATIENT REFERRED TO:  RICCO Mcintosh CNP  None      DISCHARGE MEDICATIONS:  Discharge Medication List as of 3/29/2022  9:27 AM          Discharge Medication List as of 3/29/2022  9:27 AM          Discharge Medication List as of 3/29/2022  9:27 AM          Vernita Aase Case, APRN - CNP    (Please note that portions of this note were completed with a voice recognition program. Efforts were made to edit the dictations but occasionally words are mis-transcribed.)         Vernita Aase Case, APRN - CNP  03/29/22 4319

## 2022-03-30 ENCOUNTER — OFFICE VISIT (OUTPATIENT)
Dept: PRIMARY CARE CLINIC | Age: 32
End: 2022-03-30

## 2022-03-30 VITALS
HEART RATE: 114 BPM | BODY MASS INDEX: 27.14 KG/M2 | DIASTOLIC BLOOD PRESSURE: 70 MMHG | SYSTOLIC BLOOD PRESSURE: 100 MMHG | HEIGHT: 64 IN | OXYGEN SATURATION: 99 % | TEMPERATURE: 101.5 F | WEIGHT: 159 LBS

## 2022-03-30 DIAGNOSIS — J02.0 STREP PHARYNGITIS: Primary | ICD-10-CM

## 2022-03-30 DIAGNOSIS — R50.9 FEVER, UNSPECIFIED FEVER CAUSE: ICD-10-CM

## 2022-03-30 LAB
Lab: NORMAL
QC PASS/FAIL: NORMAL
S PYO AG THROAT QL: NORMAL
SARS-COV-2 RDRP RESP QL NAA+PROBE: NEGATIVE

## 2022-03-30 PROCEDURE — 99213 OFFICE O/P EST LOW 20 MIN: CPT | Performed by: NURSE PRACTITIONER

## 2022-03-30 PROCEDURE — PBSHW POCT COVID-19 RAPID, NAAT: Performed by: NURSE PRACTITIONER

## 2022-03-30 PROCEDURE — 99212 OFFICE O/P EST SF 10 MIN: CPT | Performed by: NURSE PRACTITIONER

## 2022-03-30 PROCEDURE — PBSHW POCT RAPID STREP A: Performed by: NURSE PRACTITIONER

## 2022-03-30 PROCEDURE — 87635 SARS-COV-2 COVID-19 AMP PRB: CPT | Performed by: NURSE PRACTITIONER

## 2022-03-30 PROCEDURE — 87880 STREP A ASSAY W/OPTIC: CPT | Performed by: NURSE PRACTITIONER

## 2022-03-30 RX ORDER — AMOXICILLIN 500 MG/1
500 CAPSULE ORAL 2 TIMES DAILY
Qty: 14 CAPSULE | Refills: 0 | Status: SHIPPED | OUTPATIENT
Start: 2022-03-30 | End: 2022-04-06

## 2022-03-30 RX ORDER — DEXAMETHASONE 6 MG/1
12 TABLET ORAL ONCE
Qty: 2 TABLET | Refills: 0 | Status: SHIPPED | OUTPATIENT
Start: 2022-03-30 | End: 2022-03-30

## 2022-03-30 ASSESSMENT — ENCOUNTER SYMPTOMS
VOMITING: 0
NAUSEA: 1
SORE THROAT: 1
COUGH: 1

## 2022-03-30 ASSESSMENT — PATIENT HEALTH QUESTIONNAIRE - PHQ9
SUM OF ALL RESPONSES TO PHQ QUESTIONS 1-9: 0
SUM OF ALL RESPONSES TO PHQ QUESTIONS 1-9: 0
1. LITTLE INTEREST OR PLEASURE IN DOING THINGS: 0
2. FEELING DOWN, DEPRESSED OR HOPELESS: 0
SUM OF ALL RESPONSES TO PHQ QUESTIONS 1-9: 0
SUM OF ALL RESPONSES TO PHQ QUESTIONS 1-9: 0
SUM OF ALL RESPONSES TO PHQ9 QUESTIONS 1 & 2: 0

## 2022-03-30 NOTE — PROGRESS NOTES
Subjective:      Patient ID: Sanjana Duke is a 29 y.o. female coming in for   Chief Complaint   Patient presents with    Pharyngitis     pt was previously tested for flu and the test came back negative. pt complains of body aches,not eating and no desire to eat, sore throat pt stestes she is miserable. Pharyngitis  This is a new problem. Episode onset: 3/28/22. The problem occurs constantly. The problem has been gradually worsening. Associated symptoms include congestion, coughing, a fever, headaches, myalgias, nausea and a sore throat. Pertinent negatives include no vomiting. She has tried acetaminophen and NSAIDs (zofran for nausea, fioricet for headaches) for the symptoms. The treatment provided mild relief.   negative flu and strep yesterday and today    Review of Systems   Constitutional: Positive for fever. HENT: Positive for congestion and sore throat. Respiratory: Positive for cough. Gastrointestinal: Positive for nausea. Negative for vomiting. Musculoskeletal: Positive for myalgias. Neurological: Positive for headaches. Objective:  /70 (Site: Left Upper Arm, Position: Sitting, Cuff Size: Medium Adult)   Pulse 114   Temp 101.5 °F (38.6 °C) (Tympanic)   Ht 5' 3.5\" (1.613 m)   Wt 159 lb (72.1 kg)   LMP 03/17/2022 (Approximate)   SpO2 99%   BMI 27.72 kg/m²      Physical Exam  Vitals and nursing note reviewed. Constitutional:       General: She is not in acute distress. Appearance: Normal appearance. She is not ill-appearing. HENT:      Head: Normocephalic. Nose: Nose normal.      Mouth/Throat:      Mouth: Mucous membranes are moist.      Pharynx: Oropharyngeal exudate (small area on left tonsil) and posterior oropharyngeal erythema present. Cardiovascular:      Rate and Rhythm: Normal rate and regular rhythm. Heart sounds: Normal heart sounds. Pulmonary:      Effort: Pulmonary effort is normal.      Breath sounds: Normal breath sounds. Musculoskeletal:      Cervical back: Neck supple. Right lower leg: No edema. Left lower leg: No edema. Lymphadenopathy:      Cervical: Cervical adenopathy present. Skin:     General: Skin is warm and dry. Findings: No rash. Neurological:      General: No focal deficit present. Mental Status: She is alert and oriented to person, place, and time. Assessment:      1. Fever, unspecified fever cause           Plan:    negative flu, rapid strep and rapid covid. -pt being treated for strep based on exam/symptoms  -meds as prescribed  -continue with medication being taken at home  -encouraged fluids  -work note provided. Orders Placed This Encounter   Procedures    POCT COVID-19 Rapid, NAAT     Order Specific Question:   Is this test for diagnosis or screening? Answer:   Diagnosis of ill patient     Order Specific Question:   Symptomatic for COVID-19 as defined by CDC? Answer:   Yes     Order Specific Question:   Date of Symptom Onset     Answer:   3/28/2022     Order Specific Question:   Hospitalized for COVID-19? Answer:   No     Order Specific Question:   Admitted to ICU for COVID-19? Answer:   No     Order Specific Question:   Employed in healthcare setting? Answer:   No     Order Specific Question:   Resident in a congregate (group) care setting? Answer:   No     Order Specific Question:   Pregnant? Answer:   No     Order Specific Question:   Previously tested for COVID-19?      Answer:   Unknown      Outpatient Encounter Medications as of 3/30/2022   Medication Sig Dispense Refill    Butalbital-APAP-Caffeine (FIORICET PO) Take by mouth      naproxen (NAPROSYN) 500 MG tablet Take 1 tablet by mouth 2 times daily as needed for Pain (Patient not taking: Reported on 3/30/2022) 30 tablet 0    dicyclomine (BENTYL) 10 MG capsule Take 1 capsule by mouth every 6 hours as needed (cramping) (Patient not taking: Reported on 3/30/2022) 20 capsule 3    acetaminophen (TYLENOL) 500 MG tablet Take 2 tablets by mouth every 6 hours as needed for Pain or Fever (Patient not taking: Reported on 3/30/2022) 120 tablet 0     No facility-administered encounter medications on file as of 3/30/2022.             Renita Montgomery, APRN - CNP

## 2022-03-30 NOTE — LETTER
[address] [facility] A department of [place] [address]  Phone: [phone]  Fax: [phone]    RICCO Chaudhary CNP        March 30, 2022     Patient: Kelsea Chakraborty   YOB: 1990   Date of Visit: 3/30/2022       To Whom It May Concern: It is my medical opinion that Kelsea Chakraborty be excused from work 3/30/22-4/1/22. If you have any questions or concerns, please don't hesitate to call.     Sincerely,        RICCO Chaudhary CNP No

## 2022-04-03 ENCOUNTER — OFFICE VISIT (OUTPATIENT)
Dept: PRIMARY CARE CLINIC | Age: 32
End: 2022-04-03
Payer: COMMERCIAL

## 2022-04-03 VITALS
TEMPERATURE: 98.4 F | BODY MASS INDEX: 28.62 KG/M2 | WEIGHT: 161.5 LBS | OXYGEN SATURATION: 98 % | DIASTOLIC BLOOD PRESSURE: 62 MMHG | RESPIRATION RATE: 18 BRPM | HEIGHT: 63 IN | SYSTOLIC BLOOD PRESSURE: 108 MMHG | HEART RATE: 94 BPM

## 2022-04-03 DIAGNOSIS — J04.10 TRACHEITIS: Primary | ICD-10-CM

## 2022-04-03 PROCEDURE — 99213 OFFICE O/P EST LOW 20 MIN: CPT

## 2022-04-03 PROCEDURE — 99213 OFFICE O/P EST LOW 20 MIN: CPT | Performed by: FAMILY MEDICINE

## 2022-04-03 RX ORDER — PREDNISONE 20 MG/1
40 TABLET ORAL DAILY
Qty: 10 TABLET | Refills: 0 | Status: SHIPPED | OUTPATIENT
Start: 2022-04-03 | End: 2022-04-08

## 2022-04-03 SDOH — ECONOMIC STABILITY: FOOD INSECURITY: WITHIN THE PAST 12 MONTHS, THE FOOD YOU BOUGHT JUST DIDN'T LAST AND YOU DIDN'T HAVE MONEY TO GET MORE.: NEVER TRUE

## 2022-04-03 SDOH — ECONOMIC STABILITY: FOOD INSECURITY: WITHIN THE PAST 12 MONTHS, YOU WORRIED THAT YOUR FOOD WOULD RUN OUT BEFORE YOU GOT MONEY TO BUY MORE.: NEVER TRUE

## 2022-04-03 ASSESSMENT — PATIENT HEALTH QUESTIONNAIRE - PHQ9
1. LITTLE INTEREST OR PLEASURE IN DOING THINGS: 0
SUM OF ALL RESPONSES TO PHQ9 QUESTIONS 1 & 2: 0
2. FEELING DOWN, DEPRESSED OR HOPELESS: 0
SUM OF ALL RESPONSES TO PHQ QUESTIONS 1-9: 0

## 2022-04-03 ASSESSMENT — ENCOUNTER SYMPTOMS
COUGH: 1
RHINORRHEA: 1
SORE THROAT: 1
ALLERGIC/IMMUNOLOGIC NEGATIVE: 1
GASTROINTESTINAL NEGATIVE: 1
EYES NEGATIVE: 1

## 2022-04-03 ASSESSMENT — SOCIAL DETERMINANTS OF HEALTH (SDOH): HOW HARD IS IT FOR YOU TO PAY FOR THE VERY BASICS LIKE FOOD, HOUSING, MEDICAL CARE, AND HEATING?: NOT HARD AT ALL

## 2022-04-03 NOTE — PROGRESS NOTES
Subjective:      Patient ID: Ani Cevallos is a 29 y.o. female. HPI  Acute urgent care visit for sore throat starting on Monday. Seen Tuesday at outside facility, flu test negative. Seen again here on wed. ., covid and strep. Testing both negative. Treated for strep. Coverage with amoxicillin and steroid burst for inflammation. Throat a little better, but still sore. Painful to talk and drink and appetite still decreased. Has a cough and headache . Mostly dry cough at present. Minimal congestion/rhinorrhea. Past Medical History:   Diagnosis Date    Duodenitis 2018    Thyroid disease      Past Surgical History:   Procedure Laterality Date    LEEP      TUBAL LIGATION       Current Outpatient Medications   Medication Sig Dispense Refill    Butalbital-APAP-Caffeine (FIORICET PO) Take by mouth      amoxicillin (AMOXIL) 500 MG capsule Take 1 capsule by mouth 2 times daily for 7 days 14 capsule 0    acetaminophen (TYLENOL) 500 MG tablet Take 2 tablets by mouth every 6 hours as needed for Pain or Fever 120 tablet 0     No current facility-administered medications for this visit. Allergies   Allergen Reactions    Latex Hives    Codeine Nausea And Vomiting         Review of Systems   Constitutional: Negative. Negative for chills and fever. HENT: Positive for congestion, rhinorrhea and sore throat. Eyes: Negative. Respiratory: Positive for cough. Cardiovascular: Negative. Gastrointestinal: Negative. Endocrine: Negative. Genitourinary: Negative. Musculoskeletal: Negative. Skin: Negative. Allergic/Immunologic: Negative. Neurological: Positive for headaches. Hematological: Negative. Psychiatric/Behavioral: Negative. Objective:   Physical Exam  Constitutional:       General: She is not in acute distress. Appearance: She is well-developed. HENT:      Head: Normocephalic and atraumatic.       Right Ear: External ear normal.      Left Ear: External ear normal.      Mouth/Throat:      Pharynx: No oropharyngeal exudate. Eyes:      General: No scleral icterus. Conjunctiva/sclera: Conjunctivae normal.   Neck:      Thyroid: No thyromegaly. Cardiovascular:      Rate and Rhythm: Normal rate and regular rhythm. Heart sounds: Normal heart sounds. No murmur heard. Pulmonary:      Effort: Pulmonary effort is normal. No respiratory distress. Breath sounds: Normal breath sounds. No wheezing. Abdominal:      General: Bowel sounds are normal. There is no distension. Palpations: Abdomen is soft. Tenderness: There is no abdominal tenderness. There is no rebound. Musculoskeletal:         General: No tenderness. Normal range of motion. Cervical back: Neck supple. Skin:     General: Skin is warm and dry. Findings: No erythema or rash. Neurological:      Mental Status: She is alert and oriented to person, place, and time. Psychiatric:         Behavior: Behavior normal.         Thought Content: Thought content normal.         Judgment: Judgment normal.       /62 (Site: Left Upper Arm, Position: Sitting, Cuff Size: Small Adult)   Pulse 94   Temp 98.4 °F (36.9 °C) (Tympanic)   Resp 18   Ht 5' 3\" (1.6 m)   Wt 161 lb 8 oz (73.3 kg)   LMP 03/17/2022 (Approximate)   SpO2 98%   BMI 28.61 kg/m²     Assessment:       Encounter Diagnosis   Name Primary?  Tracheitis Yes     Ongoing coughing following uri. Dry cough at present. Plan:      Prednisone 40mg/day x 5 days  Increase cough suppression and humidity.     Recheck prn           Sana Weber MD

## 2022-05-23 ENCOUNTER — HOSPITAL ENCOUNTER (EMERGENCY)
Age: 32
Discharge: HOME | End: 2022-05-23
Payer: COMMERCIAL

## 2022-05-23 VITALS
RESPIRATION RATE: 16 BRPM | HEART RATE: 87 BPM | OXYGEN SATURATION: 99 % | SYSTOLIC BLOOD PRESSURE: 125 MMHG | TEMPERATURE: 98 F | DIASTOLIC BLOOD PRESSURE: 62 MMHG

## 2022-05-23 DIAGNOSIS — B96.89 ACUTE BACTERIAL SINUSITIS: Primary | ICD-10-CM

## 2022-05-23 DIAGNOSIS — J01.90 ACUTE BACTERIAL SINUSITIS: Primary | ICD-10-CM

## 2022-05-23 LAB — SARS-COV-2, NAA: NOT  DETECTED

## 2022-05-23 PROCEDURE — 87635 SARS-COV-2 COVID-19 AMP PRB: CPT

## 2022-05-23 PROCEDURE — 99213 OFFICE O/P EST LOW 20 MIN: CPT

## 2022-05-23 PROCEDURE — 99213 OFFICE O/P EST LOW 20 MIN: CPT | Performed by: NURSE PRACTITIONER

## 2022-05-23 RX ORDER — AMOXICILLIN AND CLAVULANATE POTASSIUM 875; 125 MG/1; MG/1
1 TABLET, FILM COATED ORAL 2 TIMES DAILY
Qty: 14 TABLET | Refills: 0 | Status: SHIPPED | OUTPATIENT
Start: 2022-05-23 | End: 2022-05-30

## 2022-05-23 RX ORDER — PREDNISONE 20 MG/1
40 TABLET ORAL DAILY
Qty: 10 TABLET | Refills: 0 | Status: SHIPPED | OUTPATIENT
Start: 2022-05-23 | End: 2022-05-28

## 2022-05-23 ASSESSMENT — ENCOUNTER SYMPTOMS
SHORTNESS OF BREATH: 0
COUGH: 0
SORE THROAT: 1
NAUSEA: 1
SINUS PRESSURE: 1
DIARRHEA: 0
VOMITING: 0

## 2022-05-23 ASSESSMENT — PAIN DESCRIPTION - DESCRIPTORS: DESCRIPTORS: ACHING

## 2022-05-23 ASSESSMENT — PAIN DESCRIPTION - LOCATION: LOCATION: HEAD

## 2022-05-23 ASSESSMENT — PAIN - FUNCTIONAL ASSESSMENT: PAIN_FUNCTIONAL_ASSESSMENT: 0-10

## 2022-05-23 ASSESSMENT — PAIN DESCRIPTION - FREQUENCY: FREQUENCY: CONTINUOUS

## 2022-05-23 ASSESSMENT — PAIN SCALES - GENERAL: PAINLEVEL_OUTOF10: 5

## 2022-05-23 NOTE — ED PROVIDER NOTES
[address]  Urgent Care Encounter       CHIEF COMPLAINT       Chief Complaint   Patient presents with    Nausea     onset this morning    Nasal Congestion     onset Thursday        Nurses Notes reviewed and I agree except as noted in the HPI. HISTORY OF PRESENT ILLNESS   Mi Iraheta is a 29 y.o. female who presents with complaints of sore throat, headache and nasal congestion, onset 5 days ago. Patient also reports developing some nausea this morning but no vomiting. She had diarrhea for the first 2 days but this has resolved. She has not documented a fever but she did feel warm yesterday. He has also been fatigued. She has occasional chills and body aches but they are mild. She has been taking over-the-counter medications with minimal relief. She did have COVID in October of last year. She is not vaccinated. Boyfriend was ill with similar symptoms but has recovered. The history is provided by the patient. REVIEW OF SYSTEMS     Review of Systems   Constitutional: Positive for appetite change, chills, fatigue and fever (Subjective). HENT: Positive for congestion, sinus pressure and sore throat. Negative for ear pain. Respiratory: Negative for cough and shortness of breath. Cardiovascular: Negative for chest pain. Gastrointestinal: Positive for nausea. Negative for diarrhea (Resolved) and vomiting. Musculoskeletal: Positive for myalgias. Skin: Negative for rash. Neurological: Positive for headaches. PAST MEDICAL HISTORY         Diagnosis Date    Duodenitis 2018    Thyroid disease        SURGICALHISTORY     Patient  has a past surgical history that includes LEEP and Tubal ligation.     CURRENT MEDICATIONS       Discharge Medication List as of 5/23/2022  8:54 AM      CONTINUE these medications which have NOT CHANGED    Details   Butalbital-APAP-Caffeine (FIORICET PO) Take by mouthHistorical Med      acetaminophen (TYLENOL) 500 MG tablet Take 2 tablets by mouth every 6 hours as needed for Pain or Fever, Disp-120 tablet, R-0OTC             ALLERGIES     Patient is is allergic to latex and codeine. Patients   Immunization History   Administered Date(s) Administered    DTaP (Infanrix) 1992, 1992, 1992, 08/10/1994, 08/25/1997    HIB PRP-T (ActHIB, Hiberix) 1992, 1992, 1992, 08/10/1994    Hepatitis B Adult (Engerix-B) 02/10/2014    Influenza Virus Vaccine 10/05/2018    MMR 08/09/1993, 08/25/1997    Polio IPV (IPOL) 1992, 1992, 1992, 08/10/1994    Rho (D) Immune Globulin 11/13/2014    Tdap (Boostrix, Adacel) 11/29/2017       FAMILY HISTORY     Patient's family history includes Asthma in her mother; Cancer in her maternal grandmother; Diabetes in her father and paternal grandmother; Emphysema in her maternal grandmother; Heart Disease in her maternal grandfather; Hypertension in her paternal grandmother; Mental Illness in her father. SOCIAL HISTORY     Patient  reports that she has never smoked. She has never used smokeless tobacco. She reports that she does not drink alcohol and does not use drugs. PHYSICAL EXAM     ED TRIAGE VITALS  BP: 125/62, Temp: 98 °F (36.7 °C), Pulse: 87, Resp: 16, SpO2: 99 %,Estimated body mass index is 28.61 kg/m² as calculated from the following:    Height as of 4/3/22: 5' 3\" (1.6 m). Weight as of 4/3/22: 161 lb 8 oz (73.3 kg). ,Patient's last menstrual period was 05/16/2022. Physical Exam  Vitals and nursing note reviewed. Constitutional:       General: She is not in acute distress. Appearance: She is well-developed. She is not ill-appearing. HENT:      Head: Normocephalic and atraumatic. Right Ear: Tympanic membrane and ear canal normal.      Left Ear: Tympanic membrane and ear canal normal.      Nose: Congestion present. Right Sinus: Maxillary sinus tenderness and frontal sinus tenderness present.       Left Sinus: Maxillary sinus tenderness and frontal sinus tenderness present. Mouth/Throat:      Lips: Pink. Mouth: Mucous membranes are moist.      Pharynx: Oropharynx is clear. Uvula midline. Posterior oropharyngeal erythema present. No pharyngeal swelling, oropharyngeal exudate or uvula swelling. Eyes:      General: Lids are normal. No scleral icterus. Conjunctiva/sclera:      Right eye: Right conjunctiva is not injected. Left eye: Left conjunctiva is not injected. Pupils: Pupils are equal.   Cardiovascular:      Rate and Rhythm: Normal rate and regular rhythm. Heart sounds: Normal heart sounds, S1 normal and S2 normal.   Pulmonary:      Effort: Pulmonary effort is normal. No respiratory distress. Breath sounds: Normal breath sounds. Musculoskeletal:      Comments: Normal active ROM x 4 extremities  Gait steady   Lymphadenopathy:      Comments: No head or neck adenopathy   Skin:     General: Skin is warm and dry. Findings: No rash (to exposed areas of skin). Neurological:      General: No focal deficit present. Mental Status: She is alert and oriented to person, place, and time. Sensory: No sensory deficit. Comments: Answers questions readily and appropriately   Psychiatric:         Mood and Affect: Mood normal.         Speech: Speech normal.         Behavior: Behavior normal. Behavior is cooperative. DIAGNOSTIC RESULTS     Labs:  Results for orders placed or performed during the hospital encounter of 05/23/22   COVID-19, Rapid   Result Value Ref Range    SARS-CoV-2, JORDY NOT  DETECTED NOT DETECTED       IMAGING:    No orders to display         EKG:      URGENT CARE COURSE:     Vitals:    05/23/22 0811   BP: 125/62   Pulse: 87   Resp: 16   Temp: 98 °F (36.7 °C)   TempSrc: Temporal   SpO2: 99%       Medications - No data to display         PROCEDURES:  None    FINAL IMPRESSION      1. Acute bacterial sinusitis          DISPOSITION/ PLAN     Patient presents with acute bacterial sinusitis. COVID was negative. Patient will be treated with Augmentin and prednisone. Continue over-the-counter medications for symptom management. Follow-up with family doctor in the next week if symptoms do not improve with treatment. Further instructions were outlined verbally and in the patient's discharge instructions. All the patient's questions were answered. The patient/parent agreed with the plan and was discharged from the  center in good condition.       PATIENT REFERRED TO:  RICCO Freed CNP  None      DISCHARGE MEDICATIONS:  Discharge Medication List as of 5/23/2022  8:54 AM      START taking these medications    Details   predniSONE (DELTASONE) 20 MG tablet Take 2 tablets by mouth daily for 5 days, Disp-10 tablet, R-0Normal      amoxicillin-clavulanate (AUGMENTIN) 875-125 MG per tablet Take 1 tablet by mouth 2 times daily for 7 days, Disp-14 tablet, R-0Normal             Discharge Medication List as of 5/23/2022  8:54 AM          Discharge Medication List as of 5/23/2022  8:54 AM          RICCO Rucker CNP    (Please note that portions of this note were completed with a voice recognition program. Efforts were made to edit the dictations but occasionally words are mis-transcribed.)         RICCO Rucker CNP  05/23/22 3330

## 2022-05-23 NOTE — ED TRIAGE NOTES
Patient walked to room 5 for nasal congestion, headache onset Thursday and this morning she woke up nauseated when she took the dogs outside she thinks from sinus drainage. No other needs.

## 2022-05-23 NOTE — Clinical Note
Marissa Traore was seen and treated in our emergency department on 5/23/2022. She may return to work on 05/24/2022. If you have any questions or concerns, please don't hesitate to call.       Kaykay Jensen, APRN - CNP

## 2022-11-30 ENCOUNTER — HOSPITAL ENCOUNTER (EMERGENCY)
Age: 32
Discharge: HOME | End: 2022-11-30
Payer: MEDICAID

## 2022-11-30 VITALS
RESPIRATION RATE: 18 BRPM | DIASTOLIC BLOOD PRESSURE: 70 MMHG | TEMPERATURE: 98.1 F | OXYGEN SATURATION: 100 % | SYSTOLIC BLOOD PRESSURE: 117 MMHG | HEART RATE: 85 BPM

## 2022-11-30 DIAGNOSIS — R10.31 RIGHT LOWER QUADRANT ABDOMINAL PAIN: Primary | ICD-10-CM

## 2022-11-30 PROCEDURE — 99213 OFFICE O/P EST LOW 20 MIN: CPT | Performed by: NURSE PRACTITIONER

## 2022-11-30 PROCEDURE — 6360000002 HC RX W HCPCS: Performed by: NURSE PRACTITIONER

## 2022-11-30 PROCEDURE — 96372 THER/PROPH/DIAG INJ SC/IM: CPT

## 2022-11-30 PROCEDURE — 99213 OFFICE O/P EST LOW 20 MIN: CPT

## 2022-11-30 RX ORDER — AMITRIPTYLINE HYDROCHLORIDE 10 MG/1
10 TABLET, FILM COATED ORAL NIGHTLY
COMMUNITY

## 2022-11-30 RX ORDER — KETOROLAC TROMETHAMINE 30 MG/ML
60 INJECTION, SOLUTION INTRAMUSCULAR; INTRAVENOUS ONCE
Status: COMPLETED | OUTPATIENT
Start: 2022-11-30 | End: 2022-11-30

## 2022-11-30 RX ADMIN — KETOROLAC TROMETHAMINE 60 MG: 30 INJECTION, SOLUTION INTRAMUSCULAR at 11:52

## 2022-11-30 ASSESSMENT — PAIN DESCRIPTION - ORIENTATION: ORIENTATION: RIGHT;LOWER

## 2022-11-30 ASSESSMENT — PAIN DESCRIPTION - PAIN TYPE: TYPE: ACUTE PAIN

## 2022-11-30 ASSESSMENT — ENCOUNTER SYMPTOMS
DIARRHEA: 0
CONSTIPATION: 0
ABDOMINAL PAIN: 1
SORE THROAT: 0
NAUSEA: 0
VOMITING: 0
SHORTNESS OF BREATH: 0
COUGH: 0

## 2022-11-30 ASSESSMENT — PAIN DESCRIPTION - FREQUENCY: FREQUENCY: CONTINUOUS

## 2022-11-30 ASSESSMENT — PAIN DESCRIPTION - DESCRIPTORS: DESCRIPTORS: SHARP

## 2022-11-30 ASSESSMENT — PAIN - FUNCTIONAL ASSESSMENT: PAIN_FUNCTIONAL_ASSESSMENT: 0-10

## 2022-11-30 ASSESSMENT — PAIN SCALES - GENERAL: PAINLEVEL_OUTOF10: 7

## 2022-11-30 ASSESSMENT — PAIN DESCRIPTION - LOCATION: LOCATION: ABDOMEN

## 2022-11-30 NOTE — ED TRIAGE NOTES
Patient walked to room 6 for right lower abdominal pain onset last night after intercourse, she said she was bleeding already.

## 2022-11-30 NOTE — ED PROVIDER NOTES
[address]  Urgent Care Encounter       CHIEF COMPLAINT       Chief Complaint   Patient presents with    Abdominal Pain     Right lower abdominal pain after intercourse with bleeding after        Nurses Notes reviewed and I agree except as noted in the HPI. HISTORY OF PRESENT ILLNESS   Noe Ng is a 29 y.o. female who presents for evaluation of right lower quadrant abdominal cramping and tenderness. Patient states that the symptoms began last night and have continued into today. Patient states that she was having intercourse with her partner last night when she began to notice vaginal bleeding. States that this has basically stopped to mild spotting but she is continue to have abdominal pain/cramping. She states that her last menstrual cycle was 2 weeks ago and she also had her tubes removed. She reports that she did not have any pain with intercourse, just bleeding. States that she has had ovarian cysts in the past and that this does feel somewhat similar. She states that she is scheduled to see a new OB/GYN in [place] tomorrow but wanted to be evaluated before hand. She denies any fever, chills, or vomiting. Patient she is sexually active with 1 male partner and has no concerns for STD. The history is provided by the patient. REVIEW OF SYSTEMS     Review of Systems   Constitutional:  Negative for chills and fever. HENT:  Negative for congestion and sore throat. Respiratory:  Negative for cough and shortness of breath. Cardiovascular:  Negative for chest pain. Gastrointestinal:  Positive for abdominal pain (cramping). Negative for constipation, diarrhea, nausea and vomiting. Genitourinary:  Positive for vaginal discharge. Negative for dyspareunia and dysuria. Musculoskeletal:  Negative for arthralgias and myalgias. Skin:  Negative for rash. Allergic/Immunologic: Negative for immunocompromised state. Neurological:  Negative for headaches.      PAST MEDICAL HISTORY         Diagnosis Date    Duodenitis 2018    Thyroid disease        SURGICALHISTORY     Patient  has a past surgical history that includes LEEP and Tubal ligation. CURRENT MEDICATIONS       Previous Medications    ACETAMINOPHEN (TYLENOL) 500 MG TABLET    Take 2 tablets by mouth every 6 hours as needed for Pain or Fever    AMITRIPTYLINE (ELAVIL) 10 MG TABLET    Take 10 mg by mouth nightly    RIZATRIPTAN BENZOATE (MAXALT PO)    Take by mouth       ALLERGIES     Patient is is allergic to latex and codeine. Patients   Immunization History   Administered Date(s) Administered    DTaP (Infanrix) 1992, 1992, 1992, 08/10/1994, 08/25/1997    HIB PRP-T (ActHIB, Hiberix) 1992, 1992, 1992, 08/10/1994    Hepatitis B Adult (Engerix-B) 02/10/2014    Influenza Virus Vaccine 10/05/2018    MMR 08/09/1993, 08/25/1997    Polio IPV (IPOL) 1992, 1992, 1992, 08/10/1994    Rho (D) Immune Globulin 11/13/2014    Tdap (Boostrix, Adacel) 11/29/2017       FAMILY HISTORY     Patient's family history includes Asthma in her mother; Cancer in her maternal grandmother; Diabetes in her father and paternal grandmother; Emphysema in her maternal grandmother; Heart Disease in her maternal grandfather; Hypertension in her paternal grandmother; Mental Illness in her father. SOCIAL HISTORY     Patient  reports that she has never smoked. She has never used smokeless tobacco. She reports that she does not drink alcohol and does not use drugs. PHYSICAL EXAM     ED TRIAGE VITALS  BP: 117/70, Temp: 98.1 °F (36.7 °C), Heart Rate: 85, Resp: 18, SpO2: 100 %,Estimated body mass index is 28.61 kg/m² as calculated from the following:    Height as of 4/3/22: 5' 3\" (1.6 m). Weight as of 4/3/22: 161 lb 8 oz (73.3 kg). ,Patient's last menstrual period was 11/13/2022. Physical Exam  Vitals and nursing note reviewed. Constitutional:       General: She is not in acute distress. Appearance: She is well-developed. She is not diaphoretic. Eyes:      Conjunctiva/sclera:      Right eye: Right conjunctiva is not injected. Left eye: Left conjunctiva is not injected. Pupils: Pupils are equal.   Cardiovascular:      Rate and Rhythm: Normal rate and regular rhythm. Heart sounds: No murmur heard. Pulmonary:      Effort: Pulmonary effort is normal. No respiratory distress. Breath sounds: Normal breath sounds. Abdominal:      General: Bowel sounds are normal.      Palpations: Abdomen is soft. Tenderness: There is abdominal tenderness in the right lower quadrant. There is no right CVA tenderness, left CVA tenderness or guarding. Musculoskeletal:      Cervical back: Normal range of motion. Skin:     General: Skin is warm. Findings: No rash. Neurological:      Mental Status: She is alert and oriented to person, place, and time. Psychiatric:         Behavior: Behavior normal.       DIAGNOSTIC RESULTS     Labs:No results found for this visit on 11/30/22. IMAGING:    No orders to display         EKG:      URGENT CARE COURSE:     Vitals:    11/30/22 1135   BP: 117/70   Pulse: 85   Resp: 18   Temp: 98.1 °F (36.7 °C)   TempSrc: Temporal   SpO2: 100%       Medications   ketorolac (TORADOL) injection 60 mg (60 mg IntraMUSCular Given 11/30/22 1152)            PROCEDURES:  None    FINAL IMPRESSION      1. Right lower quadrant abdominal pain          DISPOSITION/ PLAN     I discussed with the patient that right lower quadrant tenderness is concerning on exam.  I did discuss that she could be transferred to the emergency department for further evaluation and imaging for potential appendicitis versus ovarian cyst.  Patient states that she would prefer to wait and follow-up with her OB/GYN tomorrow for further evaluation.   I discussed that because of the vaginal bleeding I do believe that this may be more of a ovarian cyst type issue and she is given a shot of Toradol in the urgent care and she is advised to take Zofran and Phenergan that she has at home. Discussed that if her pain worsens or if her symptoms change in any way she needs to present directly to the emergency department for further evaluation and she is agreeable to plan as discussed.       PATIENT REFERRED TO:  RICCO Ng CNP  [address] [address]      DISCHARGE MEDICATIONS:  New Prescriptions    No medications on file       Discontinued Medications    BUTALBITAL-APAP-CAFFEINE (FIORICET PO)    Take by mouth       Current Discharge Medication List          RICCO Senior CNP    (Please note that portions of this note were completed with a voice recognition program. Efforts were made to edit the dictations but occasionally words are mis-transcribed.)          RICCO Senior CNP  11/30/22 4014

## 2022-12-01 ENCOUNTER — APPOINTMENT (OUTPATIENT)
Dept: ULTRASOUND IMAGING | Age: 32
End: 2022-12-01
Payer: MEDICAID

## 2022-12-01 ENCOUNTER — HOSPITAL ENCOUNTER (EMERGENCY)
Age: 32
Discharge: HOME | End: 2022-12-01
Attending: EMERGENCY MEDICINE
Payer: MEDICAID

## 2022-12-01 VITALS
TEMPERATURE: 99.2 F | HEART RATE: 72 BPM | RESPIRATION RATE: 16 BRPM | DIASTOLIC BLOOD PRESSURE: 45 MMHG | BODY MASS INDEX: 28.03 KG/M2 | HEIGHT: 64 IN | OXYGEN SATURATION: 100 % | WEIGHT: 164.2 LBS | SYSTOLIC BLOOD PRESSURE: 96 MMHG

## 2022-12-01 DIAGNOSIS — B96.89 BACTERIAL VAGINOSIS: ICD-10-CM

## 2022-12-01 DIAGNOSIS — N93.8 DYSFUNCTIONAL UTERINE BLEEDING: Primary | ICD-10-CM

## 2022-12-01 DIAGNOSIS — N76.0 BACTERIAL VAGINOSIS: ICD-10-CM

## 2022-12-01 DIAGNOSIS — N83.202 LEFT OVARIAN CYST: ICD-10-CM

## 2022-12-01 LAB
ABSOLUTE EOS #: 0.22 K/UL (ref 0–0.44)
ABSOLUTE IMMATURE GRANULOCYTE: <0.03 K/UL (ref 0–0.3)
ABSOLUTE LYMPH #: 2.09 K/UL (ref 1.1–3.7)
ABSOLUTE MONO #: 0.48 K/UL (ref 0.1–1.2)
ALBUMIN SERPL-MCNC: 4.4 G/DL (ref 3.5–5.2)
ALBUMIN/GLOBULIN RATIO: 1.9 (ref 1–2.5)
ALP BLD-CCNC: 46 U/L (ref 35–104)
ALT SERPL-CCNC: 11 U/L (ref 5–33)
ANION GAP SERPL CALCULATED.3IONS-SCNC: 9 MMOL/L (ref 9–17)
AST SERPL-CCNC: 18 U/L
BACTERIA: ABNORMAL
BASOPHILS # BLD: 1 % (ref 0–2)
BASOPHILS ABSOLUTE: 0.05 K/UL (ref 0–0.2)
BILIRUB SERPL-MCNC: 0.7 MG/DL (ref 0.3–1.2)
BILIRUBIN DIRECT: 0.2 MG/DL
BILIRUBIN URINE: NEGATIVE
BILIRUBIN, INDIRECT: 0.5 MG/DL (ref 0–1)
BUN BLDV-MCNC: 13 MG/DL (ref 6–20)
CALCIUM SERPL-MCNC: 9.4 MG/DL (ref 8.6–10.4)
CANDIDA SPECIES, DNA PROBE: NEGATIVE
CHLORIDE BLD-SCNC: 104 MMOL/L (ref 98–107)
CO2: 29 MMOL/L (ref 20–31)
CREAT SERPL-MCNC: 0.88 MG/DL (ref 0.5–0.9)
EOSINOPHILS RELATIVE PERCENT: 3 % (ref 1–4)
EPITHELIAL CELLS UA: ABNORMAL /HPF (ref 0–5)
GARDNERELLA VAGINALIS, DNA PROBE: POSITIVE
GFR SERPL CREATININE-BSD FRML MDRD: >60 ML/MIN/1.73M2
GLUCOSE BLD-MCNC: 74 MG/DL (ref 70–99)
GLUCOSE URINE: NEGATIVE
HCG(URINE) PREGNANCY TEST: NEGATIVE
HCT VFR BLD CALC: 40.7 % (ref 36.3–47.1)
HEMOGLOBIN: 13.7 G/DL (ref 11.9–15.1)
IMMATURE GRANULOCYTES: 0 %
KETONES, URINE: NEGATIVE
LEUKOCYTE ESTERASE, URINE: NEGATIVE
LIPASE: 25 U/L (ref 13–60)
LYMPHOCYTES # BLD: 31 % (ref 24–43)
MCH RBC QN AUTO: 30.9 PG (ref 25.2–33.5)
MCHC RBC AUTO-ENTMCNC: 33.7 G/DL (ref 25.2–33.5)
MCV RBC AUTO: 91.7 FL (ref 82.6–102.9)
MONOCYTES # BLD: 7 % (ref 3–12)
NITRITE, URINE: NEGATIVE
NRBC AUTOMATED: 0 PER 100 WBC
PDW BLD-RTO: 11.8 % (ref 11.8–14.4)
PH UA: 5.5 (ref 5–6)
PLATELET # BLD: 242 K/UL (ref 138–453)
PMV BLD AUTO: 9.3 FL (ref 8.1–13.5)
POTASSIUM SERPL-SCNC: 3.8 MMOL/L (ref 3.7–5.3)
PROTEIN UA: NEGATIVE
RBC # BLD: 4.44 M/UL (ref 3.95–5.11)
RBC UA: ABNORMAL /HPF (ref 0–4)
SEG NEUTROPHILS: 58 % (ref 36–65)
SEGMENTED NEUTROPHILS ABSOLUTE COUNT: 3.88 K/UL (ref 1.5–8.1)
SODIUM BLD-SCNC: 142 MMOL/L (ref 135–144)
SOURCE: ABNORMAL
SPECIFIC GRAVITY UA: 1.02 (ref 1.01–1.02)
TOTAL PROTEIN: 6.7 G/DL (ref 6.4–8.3)
TRICHOMONAS VAGINALIS DNA: NEGATIVE
URINE HGB: NEGATIVE
UROBILINOGEN, URINE: NORMAL
WBC # BLD: 6.7 K/UL (ref 3.5–11.3)
WBC UA: ABNORMAL /HPF (ref 0–4)

## 2022-12-01 PROCEDURE — 80053 COMPREHEN METABOLIC PANEL: CPT

## 2022-12-01 PROCEDURE — 93976 VASCULAR STUDY: CPT

## 2022-12-01 PROCEDURE — 83690 ASSAY OF LIPASE: CPT

## 2022-12-01 PROCEDURE — 99284 EMERGENCY DEPT VISIT MOD MDM: CPT

## 2022-12-01 PROCEDURE — 87480 CANDIDA DNA DIR PROBE: CPT

## 2022-12-01 PROCEDURE — 93975 VASCULAR STUDY: CPT

## 2022-12-01 PROCEDURE — 87491 CHLMYD TRACH DNA AMP PROBE: CPT

## 2022-12-01 PROCEDURE — 96374 THER/PROPH/DIAG INJ IV PUSH: CPT

## 2022-12-01 PROCEDURE — 87660 TRICHOMONAS VAGIN DIR PROBE: CPT

## 2022-12-01 PROCEDURE — 87510 GARDNER VAG DNA DIR PROBE: CPT

## 2022-12-01 PROCEDURE — 76856 US EXAM PELVIC COMPLETE: CPT

## 2022-12-01 PROCEDURE — 82248 BILIRUBIN DIRECT: CPT

## 2022-12-01 PROCEDURE — 81001 URINALYSIS AUTO W/SCOPE: CPT

## 2022-12-01 PROCEDURE — 6360000002 HC RX W HCPCS: Performed by: EMERGENCY MEDICINE

## 2022-12-01 PROCEDURE — 87591 N.GONORRHOEAE DNA AMP PROB: CPT

## 2022-12-01 PROCEDURE — 85025 COMPLETE CBC W/AUTO DIFF WBC: CPT

## 2022-12-01 PROCEDURE — 81025 URINE PREGNANCY TEST: CPT

## 2022-12-01 RX ORDER — IBUPROFEN 800 MG/1
800 TABLET ORAL EVERY 8 HOURS PRN
Qty: 30 TABLET | Refills: 0 | Status: SHIPPED | OUTPATIENT
Start: 2022-12-01

## 2022-12-01 RX ORDER — METRONIDAZOLE 500 MG/1
500 TABLET ORAL 2 TIMES DAILY
Qty: 14 TABLET | Refills: 0 | Status: SHIPPED | OUTPATIENT
Start: 2022-12-01 | End: 2022-12-08

## 2022-12-01 RX ORDER — KETOROLAC TROMETHAMINE 30 MG/ML
30 INJECTION, SOLUTION INTRAMUSCULAR; INTRAVENOUS ONCE
Status: COMPLETED | OUTPATIENT
Start: 2022-12-01 | End: 2022-12-01

## 2022-12-01 RX ADMIN — KETOROLAC TROMETHAMINE 30 MG: 30 INJECTION, SOLUTION INTRAMUSCULAR at 14:45

## 2022-12-01 ASSESSMENT — PAIN - FUNCTIONAL ASSESSMENT
PAIN_FUNCTIONAL_ASSESSMENT: 0-10
PAIN_FUNCTIONAL_ASSESSMENT: 0-10
PAIN_FUNCTIONAL_ASSESSMENT: ACTIVITIES ARE NOT PREVENTED
PAIN_FUNCTIONAL_ASSESSMENT: 0-10

## 2022-12-01 ASSESSMENT — PAIN DESCRIPTION - ONSET
ONSET: ON-GOING
ONSET: GRADUAL

## 2022-12-01 ASSESSMENT — PAIN SCALES - GENERAL
PAINLEVEL_OUTOF10: 2
PAINLEVEL_OUTOF10: 1
PAINLEVEL_OUTOF10: 6
PAINLEVEL_OUTOF10: 5

## 2022-12-01 ASSESSMENT — ENCOUNTER SYMPTOMS
EYE PAIN: 0
BACK PAIN: 0
CONSTIPATION: 0
SHORTNESS OF BREATH: 0
NAUSEA: 1
ABDOMINAL PAIN: 1
VOMITING: 0
BLOOD IN STOOL: 0
COUGH: 0
DIARRHEA: 0

## 2022-12-01 ASSESSMENT — PAIN DESCRIPTION - FREQUENCY
FREQUENCY: CONTINUOUS
FREQUENCY: CONTINUOUS

## 2022-12-01 ASSESSMENT — PAIN DESCRIPTION - DESCRIPTORS
DESCRIPTORS: CRAMPING
DESCRIPTORS: CRAMPING

## 2022-12-01 ASSESSMENT — PAIN DESCRIPTION - PAIN TYPE: TYPE: ACUTE PAIN

## 2022-12-01 ASSESSMENT — PAIN DESCRIPTION - LOCATION
LOCATION: ABDOMEN
LOCATION: ABDOMEN;HIP
LOCATION: ABDOMEN

## 2022-12-01 ASSESSMENT — PAIN DESCRIPTION - ORIENTATION
ORIENTATION: RIGHT
ORIENTATION: RIGHT;LOWER

## 2022-12-01 ASSESSMENT — LIFESTYLE VARIABLES: HOW OFTEN DO YOU HAVE A DRINK CONTAINING ALCOHOL: NEVER

## 2022-12-01 NOTE — ED PROVIDER NOTES
[facility]  eMERGENCY dEPARTMENT eNCOUnter      Pt Name: Danyel Vargas  MRN: [id number]  [place] 1990  Date of evaluation: 12/1/2022      CHIEF COMPLAINT       Chief Complaint   Patient presents with    Abdominal Pain    Nausea    Vaginal Bleeding     Vaginal spotting, RUQ cramping radiating to right hip and mid abd, and increased nausea after intercourse on Monday at 2230. Seen by Urgent care yesterday, given toradol but it is not helping. HISTORY OF PRESENT ILLNESS    Danyel Vargas is a 29 y.o. female who presents with vaginal bleeding abdominal pain began Monday after having intercourse she said there is nothing painful during intercourse but she started passing large amount of blood she was seen in urgent care yesterday and then referred to OB the pain is a bit worse the spotting has slowed down somewhat she has had a tubal ligation there is been no fevers chills or cough      REVIEW OF SYSTEMS         Review of Systems   Constitutional:  Negative for chills and fever. HENT:  Negative for congestion and ear pain. Eyes:  Negative for pain and visual disturbance. Respiratory:  Negative for cough and shortness of breath. Cardiovascular:  Negative for chest pain, palpitations and leg swelling. Gastrointestinal:  Positive for abdominal pain and nausea. Negative for blood in stool, constipation, diarrhea and vomiting. Endocrine: Negative for polydipsia and polyuria. Genitourinary:  Positive for pelvic pain and vaginal bleeding. Negative for difficulty urinating, dysuria, frequency and vaginal discharge. Musculoskeletal:  Negative for back pain, joint swelling, myalgias, neck pain and neck stiffness. Skin:  Negative for rash. Neurological:  Negative for dizziness, weakness and headaches. Hematological:  Negative for adenopathy. Does not bruise/bleed easily. Psychiatric/Behavioral:  Negative for confusion, self-injury and suicidal ideas.         PAST MEDICAL HISTORY    has reactive to light. Cardiovascular:      Rate and Rhythm: Normal rate and regular rhythm. Pulmonary:      Effort: Pulmonary effort is normal.      Breath sounds: Normal breath sounds. Abdominal:      General: Bowel sounds are normal.      Palpations: Abdomen is soft. Tenderness: There is abdominal tenderness in the right lower quadrant and suprapubic area. Genitourinary:     Vagina: Bleeding present. Cervix: Normal.   Musculoskeletal:         General: No tenderness. Cervical back: Normal range of motion. Skin:     General: Skin is warm and dry. Neurological:      Mental Status: She is alert and oriented to person, place, and time. Psychiatric:         Behavior: Behavior normal.         DIFFERENTIAL DIAGNOSIS/ MDM:     72-year-old female status post tubal ligation with right adnexal pain and bleeding after intercourse we will do a work-up    Differential diagnosis is ovarian cyst dysfunctional bleeding or ectopic pregnancy with her history of prior tubal ligation  Ultrasound results show no uterine abnormality the right ovary is unremarkable the left does have a complex cystic structure however this is not where she is tender I did talk with her the importance of follow-up for the cyst she also has bacterial vaginosis which we will treat  DIAGNOSTIC RESULTS     EKG: All EKG's are interpreted by the Emergency Department Physician who either signs or Co-signs this chart in the absence of a cardiologist.        RADIOLOGY:   I directly visualized the following  images and reviewed the radiologist interpretations:       [address]; ULTRASOUND OF THE PELVIS   WITH COLOR DOPPLER FLOW EVALUATION       12/1/2022       TECHNIQUE:   Transabdominal and transvaginal pelvic ultrasound was performed.; Duplex   ultrasound using B-mode/gray scaled imaging, Doppler spectral analysis and   color flow Doppler was obtained of the testicles.        COMPARISON:   CT abdomen pelvis from 12/15/2019       HISTORY:   ORDERING SYSTEM PROVIDED HISTORY: Right adnexal pain and vaginal bleeding   TECHNOLOGIST PROVIDED HISTORY:   Right adnexal pain and vaginal bleeding   Reason for Exam: right pain and bleeding       82-year-old female with right adnexal pain and vaginal bleeding       FINDINGS:       Measurements:       Patient's LMP is 11/13/2022. Uterus: 7.7 x 6.0 x 4.7 cm. Endometrial stripe: 1.3 cm. Right Ovary:3.1 x 2.8 x 2.0 cm. Left Ovary: 3.7 x 3.3 x 2.6 cm. Ultrasound Findings:       Uterus: Uterus demonstrates normal myometrial echotexture. Anteverted   uterus. No uterine mass or fibroid. Endometrial stripe: Endometrial stripe is within normal limits for size. Heterogeneous echogenicity of the endometrial stripe. Right Ovary: Right ovary is within normal limits. Normal arterial and venous   Doppler flow in association with the right ovary. Right ovarian follicles. Left Ovary:  Indeterminate complex left ovarian cystic lesion measuring 1.9 x   1.7 x 2.2 cm. Normal arterial and venous Doppler flow in association with the left ovary. Free Fluid: No evidence of free fluid. Impression   1. Normal arterial and venous Doppler flow in association with both ovaries. 2. Indeterminate complex left ovarian cystic lesion measuring 2.2 cm. Sonographic follow-up is recommend in 6-12 weeks. If unchanged, continue   follow-up with ultrasound or contrast-enhanced pelvic MRI. If follow-up   studies do not confirm resolution, endometrioma, dermoid, or hemorrhagic   cyst, consider gynecological surgical consultation. 3. Endometrial stripe thickness measures 1.3 cm, within normal limits.              ED BEDSIDE ULTRASOUND:       LABS:  Labs Reviewed   VAGINITIS DNA PROBE - Abnormal; Notable for the following components:       Result Value    Gardnerella Vaginalis, DNA Probe POSITIVE (*)     All other components within normal limits   CBC WITH AUTO DIFFERENTIAL - Abnormal; Notable for the following components:    MCHC 33.7 (*)     All other components within normal limits   MICROSCOPIC URINALYSIS - Abnormal; Notable for the following components:    Bacteria, UA TRACE (*)     All other components within normal limits   C.TRACHOMATIS N.GONORRHOEAE DNA   COMPREHENSIVE METABOLIC W/ BILI PROFILE W/ REFLEX TO MG   URINALYSIS WITH REFLEX TO CULTURE   PREGNANCY, URINE   LIPASE           EMERGENCY DEPARTMENT COURSE:   Vitals:    Vitals:    12/01/22 1323 12/01/22 1611   BP: (!) 113/58    Pulse: 78 72   Resp: 16 16   Temp: 98.9 °F (37.2 °C) 99.2 °F (37.3 °C)   TempSrc: Tympanic Tympanic   SpO2: 100% 100%   Weight: 164 lb 3.2 oz (74.5 kg)    Height: 5' 3.5\" (1.613 m)      -------------------------  BP: (!) 113/58, Temp: 99.2 °F (37.3 °C), Heart Rate: 72, Resp: 16        Re-evaluation Notes    Resting comfortably pain is down to about a 2 out of 10  CRITICAL CARE:   None        CONSULTS:      PROCEDURES:  None    FINAL IMPRESSION      1. Dysfunctional uterine bleeding    2. Bacterial vaginosis    3. Left ovarian cyst          DISPOSITION/PLAN   DISPOSITION Decision To Discharge    Condition on Disposition    Stable    PATIENT REFERRED TO:  RICCO Torres CNP  [facility] [phone] [facility]  [phone]    Schedule an appointment as soon as possible for a visit in 3 days      DISCHARGE MEDICATIONS:  New Prescriptions    IBUPROFEN (ADVIL;MOTRIN) 800 MG TABLET    Take 1 tablet by mouth every 8 hours as needed for Pain    METRONIDAZOLE (FLAGYL) 500 MG TABLET    Take 1 tablet by mouth 2 times daily for 7 days       (Please note that portions of this note were completed with a voice recognition program.  Efforts were made to edit the dictations but occasionally words are mis-transcribed.)    Jefry Reich MD,, MD, F.A.A.E.M.   Attending Emergency Physician                           Jefry Reich MD  12/01/22 7452

## 2022-12-02 LAB
C TRACH DNA GENITAL QL NAA+PROBE: NEGATIVE
N. GONORRHOEAE DNA: NEGATIVE
SPECIMEN DESCRIPTION: NORMAL

## 2022-12-08 ENCOUNTER — NURSE ONLY (OUTPATIENT)
Dept: LAB | Age: 32
End: 2022-12-08

## 2022-12-16 LAB — CYTOLOGY THIN PREP PAP: NORMAL

## 2023-07-18 LAB
BILIRUBIN URINE: NEGATIVE
BLOOD, URINE: NORMAL
CLARITY: CLEAR
COLOR, URINE: YELLOW
GLUCOSE URINE: NEGATIVE MG/DL
KETONES, URINE: NEGATIVE MG/DL
LEUKOCYTES, UA: NORMAL
NITRITE, URINE: NEGATIVE
PH, URINE: 7 (ref 5–8)
PROTEIN, URINE: NEGATIVE MG/DL
SPECIFIC GRAVITY UA: 1.01 (ref 1.01–1.02)
UROBILINOGEN, URINE: 0.2 E.U./DL (ref 0.2–1)

## 2023-07-19 LAB — URINE CULTURE, ROUTINE: NORMAL

## 2024-09-09 ENCOUNTER — OFFICE VISIT (OUTPATIENT)
Dept: PRIMARY CARE CLINIC | Age: 34
End: 2024-09-09
Payer: MEDICAID

## 2024-09-09 VITALS
HEART RATE: 74 BPM | SYSTOLIC BLOOD PRESSURE: 130 MMHG | DIASTOLIC BLOOD PRESSURE: 74 MMHG | BODY MASS INDEX: 27.55 KG/M2 | OXYGEN SATURATION: 98 % | WEIGHT: 158 LBS | TEMPERATURE: 98.3 F

## 2024-09-09 DIAGNOSIS — T63.441A BEE STING, ACCIDENTAL OR UNINTENTIONAL, INITIAL ENCOUNTER: Primary | ICD-10-CM

## 2024-09-09 DIAGNOSIS — M25.561 ACUTE PAIN OF RIGHT KNEE: ICD-10-CM

## 2024-09-09 PROCEDURE — 99213 OFFICE O/P EST LOW 20 MIN: CPT | Performed by: NURSE PRACTITIONER

## 2024-09-09 PROCEDURE — 99212 OFFICE O/P EST SF 10 MIN: CPT | Performed by: NURSE PRACTITIONER

## 2024-09-09 RX ORDER — PREDNISONE 20 MG/1
20 TABLET ORAL 2 TIMES DAILY
Qty: 10 TABLET | Refills: 0 | Status: SHIPPED | OUTPATIENT
Start: 2024-09-09 | End: 2024-09-14

## 2024-09-09 ASSESSMENT — PATIENT HEALTH QUESTIONNAIRE - PHQ9
SUM OF ALL RESPONSES TO PHQ QUESTIONS 1-9: 0
SUM OF ALL RESPONSES TO PHQ9 QUESTIONS 1 & 2: 0
SUM OF ALL RESPONSES TO PHQ QUESTIONS 1-9: 0
2. FEELING DOWN, DEPRESSED OR HOPELESS: NOT AT ALL
1. LITTLE INTEREST OR PLEASURE IN DOING THINGS: NOT AT ALL

## 2024-09-18 ENCOUNTER — OFFICE VISIT (OUTPATIENT)
Dept: PRIMARY CARE CLINIC | Age: 34
End: 2024-09-18
Payer: MEDICAID

## 2024-09-18 VITALS
HEART RATE: 88 BPM | RESPIRATION RATE: 16 BRPM | OXYGEN SATURATION: 98 % | DIASTOLIC BLOOD PRESSURE: 68 MMHG | BODY MASS INDEX: 26.43 KG/M2 | HEIGHT: 64 IN | SYSTOLIC BLOOD PRESSURE: 111 MMHG | WEIGHT: 154.8 LBS | TEMPERATURE: 99.2 F

## 2024-09-18 DIAGNOSIS — R11.0 NAUSEA: ICD-10-CM

## 2024-09-18 DIAGNOSIS — J06.9 VIRAL URI: Primary | ICD-10-CM

## 2024-09-18 DIAGNOSIS — R51.9 ACUTE NONINTRACTABLE HEADACHE, UNSPECIFIED HEADACHE TYPE: ICD-10-CM

## 2024-09-18 LAB
Lab: NORMAL
QC PASS/FAIL: NORMAL
SARS-COV-2 RDRP RESP QL NAA+PROBE: NEGATIVE

## 2024-09-18 PROCEDURE — 99212 OFFICE O/P EST SF 10 MIN: CPT

## 2024-09-18 PROCEDURE — 87635 SARS-COV-2 COVID-19 AMP PRB: CPT

## 2024-09-18 RX ORDER — ONDANSETRON 4 MG/1
4 TABLET, ORALLY DISINTEGRATING ORAL 3 TIMES DAILY PRN
Qty: 21 TABLET | Refills: 0 | Status: SHIPPED | OUTPATIENT
Start: 2024-09-18

## 2024-09-18 RX ORDER — KETOROLAC TROMETHAMINE 30 MG/ML
30 INJECTION, SOLUTION INTRAMUSCULAR; INTRAVENOUS ONCE
Status: COMPLETED | OUTPATIENT
Start: 2024-09-18 | End: 2024-09-18

## 2024-09-18 RX ORDER — PROMETHAZINE HYDROCHLORIDE 25 MG/1
25 TABLET ORAL EVERY 6 HOURS PRN
COMMUNITY
Start: 2024-01-23

## 2024-09-18 RX ADMIN — KETOROLAC TROMETHAMINE 30 MG: 30 INJECTION, SOLUTION INTRAMUSCULAR; INTRAVENOUS at 10:20

## 2024-09-18 ASSESSMENT — ENCOUNTER SYMPTOMS
VOMITING: 1
ABDOMINAL PAIN: 0
SORE THROAT: 1
SINUS PAIN: 1
RHINORRHEA: 0
DIARRHEA: 0
COUGH: 0
SHORTNESS OF BREATH: 0
NAUSEA: 1

## 2024-12-23 ENCOUNTER — HOSPITAL ENCOUNTER (EMERGENCY)
Age: 34
Discharge: HOME | End: 2024-12-23
Attending: EMERGENCY MEDICINE
Payer: MEDICAID

## 2024-12-23 ENCOUNTER — APPOINTMENT (OUTPATIENT)
Dept: CT IMAGING | Age: 34
End: 2024-12-23
Payer: MEDICAID

## 2024-12-23 VITALS
BODY MASS INDEX: 26.46 KG/M2 | HEIGHT: 64 IN | DIASTOLIC BLOOD PRESSURE: 76 MMHG | HEART RATE: 78 BPM | WEIGHT: 155 LBS | SYSTOLIC BLOOD PRESSURE: 110 MMHG | RESPIRATION RATE: 18 BRPM | OXYGEN SATURATION: 99 % | TEMPERATURE: 97.5 F

## 2024-12-23 DIAGNOSIS — R19.7 DIARRHEA, UNSPECIFIED TYPE: ICD-10-CM

## 2024-12-23 DIAGNOSIS — R10.13 EPIGASTRIC PAIN: Primary | ICD-10-CM

## 2024-12-23 LAB
ALBUMIN SERPL-MCNC: 4.3 G/DL (ref 3.5–5.2)
ALBUMIN/GLOB SERPL: 1.4 {RATIO} (ref 1–2.5)
ALP SERPL-CCNC: 59 U/L (ref 35–104)
ALT SERPL-CCNC: 17 U/L (ref 5–33)
ANION GAP SERPL CALCULATED.3IONS-SCNC: 11 MMOL/L (ref 9–17)
AST SERPL-CCNC: 23 U/L
BACTERIA URNS QL MICRO: ABNORMAL
BASOPHILS # BLD: <0.03 K/UL (ref 0–0.2)
BASOPHILS NFR BLD: 0 % (ref 0–2)
BILIRUB SERPL-MCNC: 0.4 MG/DL (ref 0.3–1.2)
BILIRUB UR QL STRIP: NEGATIVE
BUN SERPL-MCNC: 12 MG/DL (ref 6–20)
BUN/CREAT SERPL: 20 (ref 9–20)
CALCIUM SERPL-MCNC: 8.9 MG/DL (ref 8.6–10.4)
CHARACTER UR: ABNORMAL
CHLORIDE SERPL-SCNC: 103 MMOL/L (ref 98–107)
CLARITY UR: ABNORMAL
CO2 SERPL-SCNC: 24 MMOL/L (ref 20–31)
COLOR UR: YELLOW
CREAT SERPL-MCNC: 0.6 MG/DL (ref 0.5–0.9)
EOSINOPHIL # BLD: 0.07 K/UL (ref 0–0.44)
EOSINOPHILS RELATIVE PERCENT: 1 % (ref 1–4)
EPI CELLS #/AREA URNS HPF: ABNORMAL /HPF (ref 0–5)
ERYTHROCYTE [DISTWIDTH] IN BLOOD BY AUTOMATED COUNT: 11.8 % (ref 11.8–14.4)
GFR, ESTIMATED: >90 ML/MIN/1.73M2
GLUCOSE SERPL-MCNC: 91 MG/DL (ref 70–99)
GLUCOSE UR STRIP-MCNC: NEGATIVE MG/DL
HCG UR QL: NEGATIVE
HCT VFR BLD AUTO: 40.6 % (ref 36.3–47.1)
HGB BLD-MCNC: 14.1 G/DL (ref 11.9–15.1)
HGB UR QL STRIP.AUTO: ABNORMAL
IMM GRANULOCYTES # BLD AUTO: <0.03 K/UL (ref 0–0.3)
IMM GRANULOCYTES NFR BLD: 0 %
KETONES UR STRIP-MCNC: ABNORMAL MG/DL
LEUKOCYTE ESTERASE UR QL STRIP: NEGATIVE
LIPASE SERPL-CCNC: 20 U/L (ref 13–60)
LYMPHOCYTES NFR BLD: 1.64 K/UL (ref 1.1–3.7)
LYMPHOCYTES RELATIVE PERCENT: 20 % (ref 24–43)
MCH RBC QN AUTO: 30.5 PG (ref 25.2–33.5)
MCHC RBC AUTO-ENTMCNC: 34.7 G/DL (ref 25.2–33.5)
MCV RBC AUTO: 87.9 FL (ref 82.6–102.9)
MONOCYTES NFR BLD: 0.36 K/UL (ref 0.1–1.2)
MONOCYTES NFR BLD: 4 % (ref 3–12)
MUCOUS THREADS URNS QL MICRO: ABNORMAL
NEUTROPHILS NFR BLD: 75 % (ref 36–65)
NEUTS SEG NFR BLD: 6.09 K/UL (ref 1.5–8.1)
NITRITE UR QL STRIP: NEGATIVE
NRBC BLD-RTO: 0 PER 100 WBC
PH UR STRIP: 5.5 [PH] (ref 5–6)
PLATELET # BLD AUTO: 277 K/UL (ref 138–453)
PMV BLD AUTO: 9.2 FL (ref 8.1–13.5)
POTASSIUM SERPL-SCNC: 3.6 MMOL/L (ref 3.7–5.3)
PROT SERPL-MCNC: 7.3 G/DL (ref 6.4–8.3)
PROT UR STRIP-MCNC: NEGATIVE MG/DL
RBC # BLD AUTO: 4.62 M/UL (ref 3.95–5.11)
RBC #/AREA URNS HPF: ABNORMAL /HPF (ref 0–4)
SODIUM SERPL-SCNC: 138 MMOL/L (ref 135–144)
SP GR UR STRIP: 1.03 (ref 1.01–1.02)
UROBILINOGEN UR STRIP-ACNC: NORMAL EU/DL (ref 0–1)
WBC #/AREA URNS HPF: ABNORMAL /HPF (ref 0–4)
WBC OTHER # BLD: 8.2 K/UL (ref 3.5–11.3)

## 2024-12-23 PROCEDURE — 96372 THER/PROPH/DIAG INJ SC/IM: CPT

## 2024-12-23 PROCEDURE — 99284 EMERGENCY DEPT VISIT MOD MDM: CPT

## 2024-12-23 PROCEDURE — 81001 URINALYSIS AUTO W/SCOPE: CPT

## 2024-12-23 PROCEDURE — 74176 CT ABD & PELVIS W/O CONTRAST: CPT

## 2024-12-23 PROCEDURE — 85025 COMPLETE CBC W/AUTO DIFF WBC: CPT

## 2024-12-23 PROCEDURE — 80053 COMPREHEN METABOLIC PANEL: CPT

## 2024-12-23 PROCEDURE — 2500000003 HC RX 250 WO HCPCS: Performed by: EMERGENCY MEDICINE

## 2024-12-23 PROCEDURE — 6360000002 HC RX W HCPCS: Performed by: EMERGENCY MEDICINE

## 2024-12-23 PROCEDURE — 96374 THER/PROPH/DIAG INJ IV PUSH: CPT

## 2024-12-23 PROCEDURE — 2580000003 HC RX 258: Performed by: EMERGENCY MEDICINE

## 2024-12-23 PROCEDURE — 81025 URINE PREGNANCY TEST: CPT

## 2024-12-23 PROCEDURE — 83690 ASSAY OF LIPASE: CPT

## 2024-12-23 RX ORDER — DICYCLOMINE HCL 20 MG
20 TABLET ORAL 4 TIMES DAILY
Qty: 20 TABLET | Refills: 0 | Status: SHIPPED | OUTPATIENT
Start: 2024-12-23 | End: 2024-12-28

## 2024-12-23 RX ORDER — ONDANSETRON 4 MG/1
4 TABLET, FILM COATED ORAL 3 TIMES DAILY PRN
Qty: 15 TABLET | Refills: 0 | Status: SHIPPED | OUTPATIENT
Start: 2024-12-23

## 2024-12-23 RX ORDER — FAMOTIDINE 20 MG/1
20 TABLET, FILM COATED ORAL 2 TIMES DAILY
Qty: 60 TABLET | Refills: 0 | Status: SHIPPED | OUTPATIENT
Start: 2024-12-23

## 2024-12-23 RX ORDER — 0.9 % SODIUM CHLORIDE 0.9 %
1000 INTRAVENOUS SOLUTION INTRAVENOUS ONCE
Status: COMPLETED | OUTPATIENT
Start: 2024-12-23 | End: 2024-12-23

## 2024-12-23 RX ORDER — SODIUM CHLORIDE 0.9 % (FLUSH) 0.9 %
3 SYRINGE (ML) INJECTION EVERY 8 HOURS
Status: DISCONTINUED | OUTPATIENT
Start: 2024-12-23 | End: 2024-12-23 | Stop reason: HOSPADM

## 2024-12-23 RX ORDER — DICYCLOMINE HYDROCHLORIDE 10 MG/ML
20 INJECTION INTRAMUSCULAR ONCE
Status: COMPLETED | OUTPATIENT
Start: 2024-12-23 | End: 2024-12-23

## 2024-12-23 RX ORDER — ONDANSETRON 2 MG/ML
4 INJECTION INTRAMUSCULAR; INTRAVENOUS ONCE
Status: DISCONTINUED | OUTPATIENT
Start: 2024-12-23 | End: 2024-12-23 | Stop reason: HOSPADM

## 2024-12-23 RX ADMIN — SODIUM CHLORIDE 1000 ML: 9 INJECTION, SOLUTION INTRAVENOUS at 15:45

## 2024-12-23 RX ADMIN — DICYCLOMINE HYDROCHLORIDE 20 MG: 10 INJECTION, SOLUTION INTRAMUSCULAR at 17:00

## 2024-12-23 RX ADMIN — FAMOTIDINE 20 MG: 10 INJECTION, SOLUTION INTRAVENOUS at 15:55

## 2024-12-23 ASSESSMENT — PAIN DESCRIPTION - LOCATION: LOCATION: ABDOMEN

## 2024-12-23 ASSESSMENT — PAIN DESCRIPTION - DESCRIPTORS: DESCRIPTORS: CRAMPING

## 2024-12-23 ASSESSMENT — PAIN SCALES - GENERAL
PAINLEVEL_OUTOF10: 3
PAINLEVEL_OUTOF10: 4

## 2024-12-23 ASSESSMENT — LIFESTYLE VARIABLES
HOW OFTEN DO YOU HAVE A DRINK CONTAINING ALCOHOL: NEVER
HOW MANY STANDARD DRINKS CONTAINING ALCOHOL DO YOU HAVE ON A TYPICAL DAY: PATIENT DOES NOT DRINK

## 2024-12-23 ASSESSMENT — PAIN - FUNCTIONAL ASSESSMENT: PAIN_FUNCTIONAL_ASSESSMENT: 0-10

## 2024-12-23 ASSESSMENT — PAIN DESCRIPTION - ORIENTATION: ORIENTATION: UPPER

## 2024-12-23 NOTE — DISCHARGE INSTRUCTIONS
Take the medication as instructed.  Drink plenty of fluids to avoid dehydration.  Follow-up with your primary care doctor in the morning for reevaluation.  Return to the emergency department with any probs or concerns as discussed.

## 2024-12-23 NOTE — ED PROVIDER NOTES
[facility] Emergency Department  [address] [facility] [address]  Phone: [phone]  EMERGENCY DEPARTMENT ENCOUNTER      Pt Name: Ann Marie Jennings  MRN: [id number]  Birthdate 1990  Date of evaluation: 12/23/2024    CHIEF COMPLAINT       Chief Complaint   Patient presents with    Abdominal Pain       HISTORY OF PRESENT ILLNESS    Ann Marie Jennings is a 34 y.o. female who presents to the emergency department complaining of epigastric pain and diarrhea.  Symptoms have been ongoing only.  Patient's had over 7 bowel movements today.  Denies any melena, hematochezia.  She had nausea denies any vomiting.  Symptoms have not improved throughout the day.  States in the past she had duodenitis but she has not seen a gastroenterologist.  Denies any fever, chills, cough, chest pain, shortness of breath.  Denies any flank pain, hematuria, dysuria.  She denies any fall or trauma.  She has not taking anything at home for the pain.    REVIEW OF SYSTEMS     Review of Systems   All other systems reviewed and are negative.    PAST MEDICAL HISTORY    has a past medical history of Duodenitis and Thyroid disease.    SURGICAL HISTORY      has a past surgical history that includes LEEP and Tubal ligation.    CURRENT MEDICATIONS       Previous Medications    ACETAMINOPHEN (TYLENOL) 500 MG TABLET    Take 2 tablets by mouth every 6 hours as needed for Pain or Fever    AMITRIPTYLINE (ELAVIL) 10 MG TABLET    Take 1 tablet by mouth nightly    ONDANSETRON (ZOFRAN-ODT) 4 MG DISINTEGRATING TABLET    Take 1 tablet by mouth 3 times daily as needed for Nausea or Vomiting    PROMETHAZINE (PHENERGAN) 25 MG TABLET    Take 1 tablet by mouth every 6 hours as needed    RIZATRIPTAN BENZOATE (MAXALT PO)    Take by mouth       ALLERGIES     is allergic to latex and codeine.    FAMILY HISTORY     She indicated that her mother is alive. She indicated that her father is alive. She indicated that her brother is alive. She indicated that her maternal

## 2025-03-16 ENCOUNTER — OFFICE VISIT (OUTPATIENT)
Dept: PRIMARY CARE CLINIC | Age: 35
End: 2025-03-16

## 2025-03-16 VITALS
DIASTOLIC BLOOD PRESSURE: 60 MMHG | WEIGHT: 154 LBS | OXYGEN SATURATION: 99 % | HEART RATE: 74 BPM | BODY MASS INDEX: 26.43 KG/M2 | TEMPERATURE: 98 F | SYSTOLIC BLOOD PRESSURE: 98 MMHG

## 2025-03-16 DIAGNOSIS — M26.629 TMJ PAIN DYSFUNCTION SYNDROME: Primary | ICD-10-CM

## 2025-03-16 RX ORDER — PREDNISONE 20 MG/1
TABLET ORAL
Qty: 15 TABLET | Refills: 0 | Status: SHIPPED | OUTPATIENT
Start: 2025-03-16

## 2025-03-16 ASSESSMENT — PATIENT HEALTH QUESTIONNAIRE - PHQ9
SUM OF ALL RESPONSES TO PHQ QUESTIONS 1-9: 0
2. FEELING DOWN, DEPRESSED OR HOPELESS: NOT AT ALL
1. LITTLE INTEREST OR PLEASURE IN DOING THINGS: NOT AT ALL
SUM OF ALL RESPONSES TO PHQ QUESTIONS 1-9: 0

## 2025-05-21 ENCOUNTER — OFFICE VISIT (OUTPATIENT)
Dept: PRIMARY CARE CLINIC | Age: 35
End: 2025-05-21
Payer: MEDICAID

## 2025-05-21 VITALS
HEIGHT: 64 IN | OXYGEN SATURATION: 99 % | BODY MASS INDEX: 27.14 KG/M2 | HEART RATE: 92 BPM | WEIGHT: 159 LBS | TEMPERATURE: 98.2 F | SYSTOLIC BLOOD PRESSURE: 102 MMHG | DIASTOLIC BLOOD PRESSURE: 72 MMHG

## 2025-05-21 DIAGNOSIS — M25.561 ACUTE PAIN OF RIGHT KNEE: Primary | ICD-10-CM

## 2025-05-21 PROCEDURE — 99213 OFFICE O/P EST LOW 20 MIN: CPT | Performed by: PHYSICIAN ASSISTANT

## 2025-05-21 PROCEDURE — 99212 OFFICE O/P EST SF 10 MIN: CPT | Performed by: PHYSICIAN ASSISTANT

## 2025-05-21 PROCEDURE — L1812 KO ELASTIC W/JOINTS PRE OTS: HCPCS | Performed by: PHYSICIAN ASSISTANT

## 2025-05-21 RX ORDER — KETOROLAC TROMETHAMINE 10 MG/1
10 TABLET, FILM COATED ORAL EVERY 6 HOURS PRN
Qty: 20 TABLET | Refills: 0 | Status: SHIPPED | OUTPATIENT
Start: 2025-05-21 | End: 2025-06-05

## 2025-05-21 ASSESSMENT — ENCOUNTER SYMPTOMS
SHORTNESS OF BREATH: 0
DIARRHEA: 0
VOMITING: 0
NAUSEA: 0

## 2025-05-21 NOTE — PATIENT INSTRUCTIONS
Keep right knee wrapped today and tomorrow  Apply ice 20 mins at a time 4-6 times day. May alternate with heat  May use ibuprofen/ tylenol as needed for pain  Decrease activity today and tomorrow may increase as tolerated after two days of rest  Brace applied, patient tolerated well, discussed not to wear too tight  If symptoms worsen follow up with PCP  Patient verbalized understanding and agrees with plan of care

## 2025-06-04 DIAGNOSIS — M25.561 RIGHT KNEE PAIN, UNSPECIFIED CHRONICITY: Primary | ICD-10-CM

## 2025-06-05 ENCOUNTER — HOSPITAL ENCOUNTER (OUTPATIENT)
Dept: GENERAL RADIOLOGY | Age: 35
Discharge: HOME | End: 2025-06-07
Payer: MEDICAID

## 2025-06-05 ENCOUNTER — OFFICE VISIT (OUTPATIENT)
Dept: ORTHOPEDIC SURGERY | Age: 35
End: 2025-06-05
Payer: MEDICAID

## 2025-06-05 VITALS
WEIGHT: 159 LBS | OXYGEN SATURATION: 98 % | SYSTOLIC BLOOD PRESSURE: 110 MMHG | BODY MASS INDEX: 27.14 KG/M2 | DIASTOLIC BLOOD PRESSURE: 74 MMHG | HEART RATE: 75 BPM | HEIGHT: 64 IN

## 2025-06-05 DIAGNOSIS — M25.561 RIGHT KNEE PAIN, UNSPECIFIED CHRONICITY: ICD-10-CM

## 2025-06-05 DIAGNOSIS — M25.561 RIGHT KNEE PAIN, UNSPECIFIED CHRONICITY: Primary | ICD-10-CM

## 2025-06-05 DIAGNOSIS — M25.361 KNEE INSTABILITY, RIGHT: ICD-10-CM

## 2025-06-05 PROCEDURE — 99203 OFFICE O/P NEW LOW 30 MIN: CPT | Performed by: NURSE PRACTITIONER

## 2025-06-05 PROCEDURE — 99214 OFFICE O/P EST MOD 30 MIN: CPT | Performed by: NURSE PRACTITIONER

## 2025-06-05 PROCEDURE — 73562 X-RAY EXAM OF KNEE 3: CPT

## 2025-06-05 NOTE — PROGRESS NOTES
Orthopaedic Progress Note      CHIEF COMPLAINT: Right knee pain    HISTORY OF PRESENT ILLNESS:       Ms. Jennings  is a 35 y.o. female  who presents with right knee pain.  Patient denies any specific injury to her right knee.  She has noted pain off and on over the past few months.  The pain has significantly worsened in the past 2-1/2 weeks.  She states when she was in high school she has had about 3 injuries to her knees while playing athletics.  She was not required to have surgeries at that time.  She has more recently been doing home exercises tried knee bracing.  She has taken over-the-counter Advil and prescription Toradol without relief.  She does note the knee wants to catch lock and give out on her.  Does also note swelling to the right knee.  She is here today for initial orthopedic evaluation.      Past Medical History:    Past Medical History:   Diagnosis Date    Duodenitis 2018    Thyroid disease        Past Surgical History:    Past Surgical History:   Procedure Laterality Date    LEEP      TUBAL LIGATION           Current  Medications:  Current Outpatient Medications   Medication Sig Dispense Refill    ketorolac (TORADOL) 10 MG tablet Take 1 tablet by mouth every 6 hours as needed for Pain 20 tablet 0    amitriptyline (ELAVIL) 10 MG tablet Take 1 tablet by mouth nightly      predniSONE (DELTASONE) 20 MG tablet 1 bid for 5 days, 1 qd for 5 days (Patient not taking: Reported on 5/21/2025) 15 tablet 0    ondansetron (ZOFRAN) 4 MG tablet Take 1 tablet by mouth 3 times daily as needed for Nausea or Vomiting (Patient not taking: Reported on 6/5/2025) 15 tablet 0    famotidine (PEPCID) 20 MG tablet Take 1 tablet by mouth 2 times daily (Patient not taking: Reported on 6/5/2025) 60 tablet 0    dicyclomine (BENTYL) 20 MG tablet Take 1 tablet by mouth 4 times daily for 20 doses (Patient not taking: Reported on 6/5/2025) 20 tablet 0    promethazine (PHENERGAN) 25 MG tablet Take 1 tablet by mouth every

## 2025-06-17 ENCOUNTER — HOSPITAL ENCOUNTER (OUTPATIENT)
Dept: MRI IMAGING | Age: 35
Discharge: HOME | End: 2025-06-19
Payer: MEDICAID

## 2025-06-17 DIAGNOSIS — M25.561 RIGHT KNEE PAIN, UNSPECIFIED CHRONICITY: ICD-10-CM

## 2025-06-17 PROCEDURE — 73721 MRI JNT OF LWR EXTRE W/O DYE: CPT

## 2025-06-24 ENCOUNTER — TELEPHONE (OUTPATIENT)
Dept: ORTHOPEDIC SURGERY | Age: 35
End: 2025-06-24

## 2025-06-24 NOTE — TELEPHONE ENCOUNTER
Writer called patient to discuss mri results. Message left on voicemail for patient to call the clinic at [phone].

## 2025-06-24 NOTE — TELEPHONE ENCOUNTER
Writer called and spoke with patient at this time. Writer informed patient that her mri of the right knee was normal. Patient states that she is still having pain and would like to discuss other options.  scheduled patient an appointment for Tuesday July 15th at 7:45 am.

## 2025-07-15 ENCOUNTER — OFFICE VISIT (OUTPATIENT)
Dept: ORTHOPEDIC SURGERY | Age: 35
End: 2025-07-15
Payer: MEDICAID

## 2025-07-15 VITALS
HEIGHT: 64 IN | SYSTOLIC BLOOD PRESSURE: 115 MMHG | BODY MASS INDEX: 27.14 KG/M2 | HEART RATE: 76 BPM | DIASTOLIC BLOOD PRESSURE: 74 MMHG | WEIGHT: 159 LBS

## 2025-07-15 DIAGNOSIS — M25.361 KNEE INSTABILITY, RIGHT: ICD-10-CM

## 2025-07-15 DIAGNOSIS — M25.561 RIGHT KNEE PAIN, UNSPECIFIED CHRONICITY: Primary | ICD-10-CM

## 2025-07-15 PROCEDURE — 99214 OFFICE O/P EST MOD 30 MIN: CPT | Performed by: PHYSICIAN ASSISTANT

## 2025-07-15 PROCEDURE — 99213 OFFICE O/P EST LOW 20 MIN: CPT | Performed by: PHYSICIAN ASSISTANT

## 2025-07-15 NOTE — PROGRESS NOTES
Orthopaedic Progress Note      CHIEF COMPLAINT: Right knee pain    HISTORY OF PRESENT ILLNESS:       Ms. Jennigns  is a 35 y.o. female  who presents with a history of right knee pain.  This problems been going on for little over a month.  She recently had an MRI of her right knee which was negative for any acute bony abnormalities.  She states the pain persist.  Worse with activity relieved with rest.  She is here today for reevaluation      Past Medical History:    Past Medical History:   Diagnosis Date    Duodenitis 2018    Thyroid disease        Past Surgical History:    Past Surgical History:   Procedure Laterality Date    LEEP      TUBAL LIGATION           Current  Medications:  Current Outpatient Medications   Medication Sig Dispense Refill    ondansetron (ZOFRAN) 4 MG tablet Take 1 tablet by mouth 3 times daily as needed for Nausea or Vomiting 15 tablet 0    famotidine (PEPCID) 20 MG tablet Take 1 tablet by mouth 2 times daily 60 tablet 0    promethazine (PHENERGAN) 25 MG tablet Take 1 tablet by mouth every 6 hours as needed      Rizatriptan Benzoate (MAXALT PO) Take by mouth      amitriptyline (ELAVIL) 10 MG tablet Take 1 tablet by mouth nightly      ketorolac (TORADOL) 10 MG tablet Take 1 tablet by mouth every 6 hours as needed for Pain 20 tablet 0    predniSONE (DELTASONE) 20 MG tablet 1 bid for 5 days, 1 qd for 5 days (Patient not taking: Reported on 5/21/2025) 15 tablet 0    dicyclomine (BENTYL) 20 MG tablet Take 1 tablet by mouth 4 times daily for 20 doses (Patient not taking: Reported on 6/5/2025) 20 tablet 0    acetaminophen (TYLENOL) 500 MG tablet Take 2 tablets by mouth every 6 hours as needed for Pain or Fever (Patient not taking: Reported on 6/5/2025) 120 tablet 0     No current facility-administered medications for this visit.       Allergies:  Latex and Codeine    Social History:   Social History     Tobacco Use   Smoking Status Never   Smokeless Tobacco Never   Tobacco Comments

## 2025-07-22 ENCOUNTER — HOSPITAL ENCOUNTER (OUTPATIENT)
Dept: PHYSICAL THERAPY | Age: 35
Setting detail: THERAPIES SERIES
Discharge: HOME | End: 2025-07-22
Payer: MEDICAID

## 2025-07-22 PROCEDURE — 97161 PT EVAL LOW COMPLEX 20 MIN: CPT | Performed by: PHYSICAL THERAPIST

## 2025-07-22 PROCEDURE — 97140 MANUAL THERAPY 1/> REGIONS: CPT | Performed by: PHYSICAL THERAPIST

## 2025-07-22 ASSESSMENT — PAIN SCALES - GENERAL: PAINLEVEL_OUTOF10: 2

## 2025-07-22 NOTE — PLAN OF CARE
[facility]/[place] [facility]  Rehabilitation and Sports Medicine    [x] [place]  Phone: [phone]  Fax: [phone]      [] [place]  Phone: [phone]  Fax: [phone]        To:    Rafy Mcgrath MD      Patient: Ann Marie Jennings  : 1990   MRN: [id number]  Evaluation Date: 2025      Diagnosis Information:   Right knee pain, unspecified chronicity [M25.561]  Knee instability, right [M25.361]          Physical Therapy Certification  Dear Rafy Mcgrath MD   The following patient has been evaluated for physical therapy services and for therapy to continue, Medicare requires monthly physician review of the treatment plan. Please review the attached evaluation and/or summary of the patient's plan of care, and verify that you agree therapy should continue by signing the attached document and sending it back to our office.    Plan of Care/Treatment to date:  [x] Therapeutic Exercise    [x] Modalities:  [x] Therapeutic Activity     [] Ultrasound  [] Electrical Stimulation  [x] Gait Training      [] Cervical Traction [] Lumbar Traction  [x] Neuromuscular Re-education    [] Cold/hotpack [] Iontophoresis   [x] Instruction in HEP     Other:  [x] Manual Therapy      []             [] Aquatic Therapy      []                 Goals:  Short Term Goals  Time Frame for Short Term Goals: 3 weeks  Short Term Goal 1: Initiate HEP    Long Term Goals  Time Frame for Long Term Goals : 6 weeks  Long Term Goal 1: Independent in HEP  Long Term Goal 2: Pelvic Symmetry  Long Term Goal 3: Patient to note no pain with amb x 1 mile.  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  Long Term Goal 5: Improve LEFS 65/80    Frequency/Duration:25-25  # Days per week: [] 1 day # Weeks: [] 1 week [] 5 weeks     [x] 2 days   [] 2 weeks [x] 6 weeks     [x] 3 days   [] 3 weeks [] 7 weeks     [] 4 days   [] 4 weeks [] 8 weeks    Rehab Potential: [] Excellent [x] Good [] Fair  []

## 2025-07-22 NOTE — FLOWSHEET NOTE
Physical Therapy Daily Treatment Note    Date:  2025    Patient Name:  Ann Marie Jennings    :  1990  MRN: [id number]  Restrictions/Precautions:     Medical/Treatment Diagnosis Information:    Right knee pain, unspecified chronicity [M25.561]  Knee instability, right [M25.361]    Insurance/Certification information:   [facility] MEDICAID   Physician Information:   Rafy Mcgrath MD   Plan of care signed (Y/N):  n  Visit# / total visits:  1/10  Pain level: /10       Time In: 1305  Time Out:1345    Progress Note: [x]  Yes  []  No  Next due by: Visit #10  Or by    Subjective:   see eval    Objective: see eval  Observation:   Test measurements:      Exercises:   Exercise/Equipment Resistance/Repetitions Other comments        Abd Brace     PPT      LTR      Bridging     Prone hip ext      Prone Press           Counter Exs     Steps      Squats      IT Stretch                     Manual Therapy  L PA mob 30\" x 2, L hip abd/add 5\" x 5          [] Provided verbal/tactile cueing for activities related to strengthening, flexibility, endurance, ROM. (37349)  [] Provided verbal/tactile cueing for activities related to improving balance, coordination, kinesthetic sense, posture, motor skill, proprioception. (44256)    Therapeutic Activities:     [] Therapeutic activities, direct (one-on-one) patient contact (use of dynamic activities to improve functional performance). (84418)    Gait:   [] Provided training and instruction to the patient for ambulation re-education. (92023)    Self-Care/ADL's  [] Self-care/home management training and compensatory training, meal preparation, safety procedures, and instructions in use of assistive technology devices/adaptive equipment, direct one-on-one contact. (03833)    Home Exercise Program:     [] Reviewed/Progressed HEP activities related to strengthening, flexibility, endurance, ROM. (62990)  [] Reviewed/Progressed HEP activities related to improving balance, coordination, kinesthetic

## 2025-07-22 NOTE — PROGRESS NOTES
Physical Therapy  Initial Assessment  Date: 2025  Patient Name: Ann Marie Jennings  MRN: [id number]  : 1990    Referring Physician: Rafy Mcgrath MD     PCP: Edgar Brooks APRN - CNP     Medical Diagnosis: Right knee pain, unspecified chronicity [M25.561]  Knee instability, right [M25.361]    No data recorded    Insurance: Payor: [employer] [place] / Plan: [employer] [place] / Product Type: *No Product type* /   Insurance ID: [id number] - (Medicaid Managed)    Subjective:  General  Chart Reviewed: Yes  Patient Assessed for Rehabilitation Services: Yes  General  General Comments: R lateral knee pain, has a sensation of sharp, also spasms.  Patient noting increased pain with standing.  Subjective  Subjective: 35 y.o. female  who presents with a history of right knee pain.  This problems been going on for little over a month.  She recently had an MRI of her right knee which was negative for any acute bony abnormalities.  She states the pain persist.  Worse with activity relieved with rest.  She is here today for reevaluation  Prior diagnostic testing:: X-ray, MRI  Pain Screening  Patient Currently in Pain: Yes  Pain Assessment: 0-10  Pain Level: 2  Best Pain Level: 0  Worst Pain Level: 10       Vision/Hearing:  Vision  Vision: Within Functional Limits  Hearing  Hearing: Within functional limits    Orientation:  Orientation  Overall Orientation Status: Within Functional Limits    Functional Status:  Functional Status  Type of Occupation: Full time, , does health insurance visits.  Prior Level of Assist for ADLs: Independent  Prior Level of Assist for Homemaking: Independent  Homemaking Responsibilities: Yes  Ambulation Assistance: Independent  Prior Level of Assist for Transfers: Independent  Active : Yes  Mode of Transportation: Bus    Objective:          Spine  Lumbar: Trunk AROM WFL    Strength RLE  Strength RLE: Exception  R Hip Flexion: 5/5  R Hip ABduction: 5/5  R Hip ADduction:

## 2025-07-23 ENCOUNTER — HOSPITAL ENCOUNTER (OUTPATIENT)
Dept: PHYSICAL THERAPY | Age: 35
Setting detail: THERAPIES SERIES
Discharge: HOME | End: 2025-07-23
Payer: MEDICAID

## 2025-07-23 PROCEDURE — 97110 THERAPEUTIC EXERCISES: CPT

## 2025-07-23 PROCEDURE — 97140 MANUAL THERAPY 1/> REGIONS: CPT

## 2025-07-23 NOTE — FLOWSHEET NOTE
Physical Therapy Daily Treatment Note    Date:  2025    Patient Name:  Ann Marie Jennings    :  1990  MRN: [id number]  Restrictions/Precautions:     Medical/Treatment Diagnosis Information:    Right knee pain, unspecified chronicity [M25.561]  Knee instability, right [M25.361]    Insurance/Certification information:   [employer] MEDICAID   Physician Information:   Rafy Mcgrath MD   Plan of care signed (Y/N):  n  Visit# / total visits:  2/10  Pain level: /10       Time In:     9:47  Time Out:     10:14    Progress Note: []  Yes  [x]  No  Next due by: Visit #10  Or by    Subjective:   Pt reports pain in R knee is a little better right now as she just got out of bed not too long ago. Pain starts right above the knee and shoots down to mid shin. Hasn't been doing much walking the last few days, but was in the pool last night.     Objective: There ex performed per flowsheet to reduce R knee pain, improve core and R knee strength, and re-align pelvis. Verbal cueing for exercise technique and order of exercises. IASTM to R lateral knee and IT band to reduce tightness.     Observation: Able to complete step ups on 15\" step with no pain in R knee, but did report pulling in lateral knee.   Test measurements:      Exercises:   Exercise/Equipment Resistance/Repetitions Other comments        Abd Brace 5\"x10 Initiated   PPT  5\"x10 Initiated   LTR  5\"x10 Initiated   Bridging 10x  Initiated   Prone hip ext  10x  Initiated   Prone Press  10x Initiated        Counter Exs 15x each 3 way hip, HRTRs, marching, HS curls    Initiated   Step ups  R  5x 15\" Initiated   Squats  10x  Initiated   IT Stretch  2x30\" Initiated             IASTM R lateral knee/IT band 5' Initiated   Manual Therapy  L PA mob sacrum 20\" x 2, L hip abd/add 5\"x5          [x] Provided verbal/tactile cueing for activities related to strengthening, flexibility, endurance, ROM. (23138)  [] Provided verbal/tactile cueing for activities related to improving balance,

## 2025-07-28 ENCOUNTER — HOSPITAL ENCOUNTER (OUTPATIENT)
Dept: PHYSICAL THERAPY | Age: 35
Setting detail: THERAPIES SERIES
Discharge: HOME | End: 2025-07-28
Payer: MEDICAID

## 2025-07-28 PROCEDURE — 97110 THERAPEUTIC EXERCISES: CPT

## 2025-07-28 NOTE — FLOWSHEET NOTE
Physical Therapy Daily Treatment Note    Date:  2025    Patient Name:  Ann Marie Jennings    :  1990  MRN: [id number]  Restrictions/Precautions:     Medical/Treatment Diagnosis Information:    Right knee pain, unspecified chronicity [M25.561]  Knee instability, right [M25.361]    Insurance/Certification information:   [employer]   Physician Information:   Rafy Mcgrath MD   Plan of care signed (Y/N):  n  Visit# / total visits:  3/10  Pain level: /10       Time In:     11:22  Time Out:     11:51    Progress Note: []  Yes  [x]  No  Next due by: Visit #10  Or by    Subjective:   Pt reports R knee has been very sore. Soreness starts right above the knee and shoots down to mid shin. Hasn't been doing much walking the last few days. Went to the chiropractor Thursday and felt great afterwards.     Objective: There ex performed per flowsheet to reduce R knee pain, improve core and R knee strength, and re-align pelvis. Verbal cueing for exercise technique and order of exercises. IASTM to R lateral knee and IT band to reduce tightness. Checked pelvis, no leg length discrepancies therefore held manual PA mobs.     Observation: Able to complete step ups on 15\" step with no pain in R knee, but did report pulling in lateral knee.   Test measurements:      Exercises:   Exercise/Equipment Resistance/Repetitions Other comments        Abd Brace 5\"x10    PPT  5\"x10    LTR  5\"x10    Bridging 10x  ea Narrow, wide    Prone hip ext  10x     Prone Press  10x         Counter Exs 15x each 3 way hip, HRTRs, marching, HS curls    Initiated   Step ups  R  10x 15\"    Squats  10x ea Narrow, wide    IT Stretch  2x30\"              IASTM R lateral knee/IT band 5'    Manual Therapy          [x] Provided verbal/tactile cueing for activities related to strengthening, flexibility, endurance, ROM. (19631)  [] Provided verbal/tactile cueing for activities related to improving balance, coordination, kinesthetic sense, posture, motor skill,

## 2025-07-31 ENCOUNTER — HOSPITAL ENCOUNTER (OUTPATIENT)
Dept: PHYSICAL THERAPY | Age: 35
Setting detail: THERAPIES SERIES
Discharge: HOME | End: 2025-07-31
Payer: MEDICAID

## 2025-07-31 PROCEDURE — 97110 THERAPEUTIC EXERCISES: CPT

## 2025-07-31 NOTE — FLOWSHEET NOTE
cueing for activities related to improving balance, coordination, kinesthetic sense, posture, motor skill, proprioception. (86953)    Therapeutic Activities:     [] Therapeutic activities, direct (one-on-one) patient contact (use of dynamic activities to improve functional performance). (23147)    Gait:   [] Provided training and instruction to the patient for ambulation re-education. (91850)    Self-Care/ADL's  [] Self-care/home management training and compensatory training, meal preparation, safety procedures, and instructions in use of assistive technology devices/adaptive equipment, direct one-on-one contact. (66304)    Home Exercise Program:     [] Reviewed/Progressed HEP activities related to strengthening, flexibility, endurance, ROM. (01840)  [] Reviewed/Progressed HEP activities related to improving balance, coordination, kinesthetic sense, posture, motor skill, proprioception.  (67936)    Manual Treatments:    [] Provided manual therapy to mobilize soft tissue/joints for the purpose of modulating pain, promoting relaxation,  increasing ROM, reducing/eliminating soft tissue swelling/inflammation/restriction, improving soft tissue extensibility. (12419)    Service Based Modalities:      Timed Code Treatment Minutes:    38' there ex     Total Treatment Minutes:   38'    Treatment/Activity Tolerance:  [x] Patient tolerated treatment well [] Patient limited by fatique  [] Patient limited by pain  [] Patient limited by other medical complications  [] Other:     Prognosis: [x] Good [] Fair  [] Poor    Patient Requires Follow-up: [x] Yes  [] No    Goals:  Short Term Goals  Time Frame for Short Term Goals: 3 weeks  Short Term Goal 1: Initiate HEP    Long Term Goals  Time Frame for Long Term Goals : 6 weeks  Long Term Goal 1: Independent in HEP  Long Term Goal 2: Pelvic Symmetry  Long Term Goal 3: Patient to note no pain with amb x 1 mile.  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

## 2025-08-05 ENCOUNTER — HOSPITAL ENCOUNTER (OUTPATIENT)
Dept: PHYSICAL THERAPY | Age: 35
Setting detail: THERAPIES SERIES
Discharge: HOME | End: 2025-08-05
Payer: MEDICAID

## 2025-08-05 PROCEDURE — 97110 THERAPEUTIC EXERCISES: CPT | Performed by: PHYSICAL THERAPY ASSISTANT

## 2025-08-07 ENCOUNTER — HOSPITAL ENCOUNTER (OUTPATIENT)
Dept: PHYSICAL THERAPY | Age: 35
Setting detail: THERAPIES SERIES
Discharge: HOME | End: 2025-08-07
Payer: MEDICAID

## 2025-08-07 PROCEDURE — 97110 THERAPEUTIC EXERCISES: CPT

## 2025-08-11 ENCOUNTER — HOSPITAL ENCOUNTER (OUTPATIENT)
Dept: PHYSICAL THERAPY | Age: 35
Setting detail: THERAPIES SERIES
Discharge: HOME | End: 2025-08-11
Payer: MEDICAID

## 2025-08-11 PROCEDURE — 97110 THERAPEUTIC EXERCISES: CPT

## 2025-08-13 ENCOUNTER — APPOINTMENT (OUTPATIENT)
Dept: PHYSICAL THERAPY | Age: 35
End: 2025-08-13
Payer: MEDICAID

## 2025-08-14 ENCOUNTER — HOSPITAL ENCOUNTER (OUTPATIENT)
Dept: PHYSICAL THERAPY | Age: 35
Setting detail: THERAPIES SERIES
Discharge: HOME | End: 2025-08-14
Payer: MEDICAID

## 2025-08-14 PROCEDURE — 97110 THERAPEUTIC EXERCISES: CPT | Performed by: PHYSICAL THERAPIST

## 2025-08-18 ENCOUNTER — APPOINTMENT (OUTPATIENT)
Dept: PHYSICAL THERAPY | Age: 35
End: 2025-08-18
Payer: MEDICAID

## 2025-08-18 ENCOUNTER — HOSPITAL ENCOUNTER (OUTPATIENT)
Dept: PHYSICAL THERAPY | Age: 35
Setting detail: THERAPIES SERIES
Discharge: HOME | End: 2025-08-18
Payer: MEDICAID

## 2025-08-18 PROCEDURE — 97110 THERAPEUTIC EXERCISES: CPT

## 2025-08-21 ENCOUNTER — HOSPITAL ENCOUNTER (OUTPATIENT)
Dept: PHYSICAL THERAPY | Age: 35
Setting detail: THERAPIES SERIES
Discharge: HOME | End: 2025-08-21
Payer: MEDICAID

## 2025-08-21 PROCEDURE — 97110 THERAPEUTIC EXERCISES: CPT | Performed by: PHYSICAL THERAPIST
